# Patient Record
Sex: FEMALE | Race: WHITE | NOT HISPANIC OR LATINO | Employment: OTHER | URBAN - NONMETROPOLITAN AREA
[De-identification: names, ages, dates, MRNs, and addresses within clinical notes are randomized per-mention and may not be internally consistent; named-entity substitution may affect disease eponyms.]

---

## 2021-06-06 ENCOUNTER — HOSPITAL ENCOUNTER (EMERGENCY)
Facility: HOSPITAL | Age: 76
Discharge: LEFT BEFORE DISCHARGE | End: 2021-06-06
Admitting: EMERGENCY MEDICINE
Payer: COMMERCIAL

## 2021-06-06 VITALS
HEART RATE: 81 BPM | SYSTOLIC BLOOD PRESSURE: 149 MMHG | RESPIRATION RATE: 16 BRPM | TEMPERATURE: 97.7 F | DIASTOLIC BLOOD PRESSURE: 66 MMHG | OXYGEN SATURATION: 94 %

## 2021-06-06 PROCEDURE — 999N000104 HC STATISTIC NO CHARGE

## 2024-09-19 ENCOUNTER — LAB REQUISITION (OUTPATIENT)
Dept: LAB | Facility: CLINIC | Age: 79
End: 2024-09-19
Payer: COMMERCIAL

## 2024-09-19 DIAGNOSIS — M16.12 UNILATERAL PRIMARY OSTEOARTHRITIS, LEFT HIP: ICD-10-CM

## 2024-09-19 DIAGNOSIS — R13.19 OTHER DYSPHAGIA: ICD-10-CM

## 2024-09-19 DIAGNOSIS — I50.32 CHRONIC DIASTOLIC (CONGESTIVE) HEART FAILURE (H): ICD-10-CM

## 2024-09-20 LAB
ALBUMIN SERPL BCG-MCNC: 3.2 G/DL (ref 3.5–5.2)
ALP SERPL-CCNC: 209 U/L (ref 40–150)
ALT SERPL W P-5'-P-CCNC: 46 U/L (ref 0–50)
ANION GAP SERPL CALCULATED.3IONS-SCNC: 8 MMOL/L (ref 7–15)
AST SERPL W P-5'-P-CCNC: 90 U/L (ref 0–45)
BASOPHILS # BLD AUTO: 0 10E3/UL (ref 0–0.2)
BASOPHILS NFR BLD AUTO: 0 %
BILIRUB SERPL-MCNC: 0.5 MG/DL
BUN SERPL-MCNC: 12.7 MG/DL (ref 8–23)
CALCIUM SERPL-MCNC: 8.9 MG/DL (ref 8.8–10.4)
CHLORIDE SERPL-SCNC: 102 MMOL/L (ref 98–107)
CREAT SERPL-MCNC: 0.66 MG/DL (ref 0.51–0.95)
EGFRCR SERPLBLD CKD-EPI 2021: 90 ML/MIN/1.73M2
EOSINOPHIL # BLD AUTO: 0.2 10E3/UL (ref 0–0.7)
EOSINOPHIL NFR BLD AUTO: 3 %
ERYTHROCYTE [DISTWIDTH] IN BLOOD BY AUTOMATED COUNT: 12.7 % (ref 10–15)
GLUCOSE SERPL-MCNC: 135 MG/DL (ref 70–99)
HCO3 SERPL-SCNC: 27 MMOL/L (ref 22–29)
HCT VFR BLD AUTO: 28.3 % (ref 35–47)
HGB BLD-MCNC: 9 G/DL (ref 11.7–15.7)
IMM GRANULOCYTES # BLD: 0 10E3/UL
IMM GRANULOCYTES NFR BLD: 0 %
LYMPHOCYTES # BLD AUTO: 1 10E3/UL (ref 0.8–5.3)
LYMPHOCYTES NFR BLD AUTO: 15 %
MCH RBC QN AUTO: 30.4 PG (ref 26.5–33)
MCHC RBC AUTO-ENTMCNC: 31.8 G/DL (ref 31.5–36.5)
MCV RBC AUTO: 96 FL (ref 78–100)
MONOCYTES # BLD AUTO: 0.6 10E3/UL (ref 0–1.3)
MONOCYTES NFR BLD AUTO: 9 %
NEUTROPHILS # BLD AUTO: 4.7 10E3/UL (ref 1.6–8.3)
NEUTROPHILS NFR BLD AUTO: 72 %
NRBC # BLD AUTO: 0 10E3/UL
NRBC BLD AUTO-RTO: 0 /100
PLATELET # BLD AUTO: 254 10E3/UL (ref 150–450)
POTASSIUM SERPL-SCNC: 4 MMOL/L (ref 3.4–5.3)
PROT SERPL-MCNC: 6.1 G/DL (ref 6.4–8.3)
RBC # BLD AUTO: 2.96 10E6/UL (ref 3.8–5.2)
SODIUM SERPL-SCNC: 137 MMOL/L (ref 135–145)
WBC # BLD AUTO: 6.5 10E3/UL (ref 4–11)

## 2024-09-20 PROCEDURE — 36415 COLL VENOUS BLD VENIPUNCTURE: CPT | Mod: ORL | Performed by: FAMILY MEDICINE

## 2024-09-20 PROCEDURE — 80053 COMPREHEN METABOLIC PANEL: CPT | Mod: ORL | Performed by: FAMILY MEDICINE

## 2024-09-20 PROCEDURE — P9603 ONE-WAY ALLOW PRORATED MILES: HCPCS | Mod: ORL | Performed by: FAMILY MEDICINE

## 2024-09-20 PROCEDURE — 85025 COMPLETE CBC W/AUTO DIFF WBC: CPT | Mod: ORL | Performed by: FAMILY MEDICINE

## 2025-06-11 ENCOUNTER — APPOINTMENT (OUTPATIENT)
Dept: GENERAL RADIOLOGY | Facility: CLINIC | Age: 80
DRG: 885 | End: 2025-06-11
Attending: EMERGENCY MEDICINE
Payer: COMMERCIAL

## 2025-06-11 ENCOUNTER — HOSPITAL ENCOUNTER (INPATIENT)
Facility: CLINIC | Age: 80
DRG: 885 | End: 2025-06-11
Attending: EMERGENCY MEDICINE | Admitting: INTERNAL MEDICINE
Payer: COMMERCIAL

## 2025-06-11 DIAGNOSIS — F23 ACUTE PSYCHOSIS (H): ICD-10-CM

## 2025-06-11 DIAGNOSIS — M54.50 LOW BACK PAIN, UNSPECIFIED BACK PAIN LATERALITY, UNSPECIFIED CHRONICITY, UNSPECIFIED WHETHER SCIATICA PRESENT: ICD-10-CM

## 2025-06-11 DIAGNOSIS — F41.9 ANXIETY: ICD-10-CM

## 2025-06-11 DIAGNOSIS — K21.9 GASTROESOPHAGEAL REFLUX DISEASE, UNSPECIFIED WHETHER ESOPHAGITIS PRESENT: ICD-10-CM

## 2025-06-11 DIAGNOSIS — J12.82 PNEUMONIA DUE TO COVID-19 VIRUS: ICD-10-CM

## 2025-06-11 DIAGNOSIS — R21 RASH: Primary | ICD-10-CM

## 2025-06-11 DIAGNOSIS — U07.1 PNEUMONIA DUE TO COVID-19 VIRUS: ICD-10-CM

## 2025-06-11 LAB
ALBUMIN SERPL BCG-MCNC: 4.6 G/DL (ref 3.5–5.2)
ALBUMIN UR-MCNC: NEGATIVE MG/DL
ALP SERPL-CCNC: 106 U/L (ref 40–150)
ALT SERPL W P-5'-P-CCNC: 30 U/L (ref 0–50)
ANION GAP SERPL CALCULATED.3IONS-SCNC: 14 MMOL/L (ref 7–15)
APPEARANCE UR: ABNORMAL
AST SERPL W P-5'-P-CCNC: 52 U/L (ref 0–45)
BACTERIA #/AREA URNS HPF: ABNORMAL /HPF
BASOPHILS # BLD AUTO: 0 10E3/UL (ref 0–0.2)
BASOPHILS NFR BLD AUTO: 0 %
BILIRUB SERPL-MCNC: 0.6 MG/DL
BILIRUB UR QL STRIP: NEGATIVE
BUN SERPL-MCNC: 19.1 MG/DL (ref 8–23)
CALCIUM SERPL-MCNC: 10.2 MG/DL (ref 8.8–10.4)
CHLORIDE SERPL-SCNC: 99 MMOL/L (ref 98–107)
COLOR UR AUTO: ABNORMAL
CREAT SERPL-MCNC: 0.88 MG/DL (ref 0.51–0.95)
CYCLE THRESHOLD (CT): 29.3
EGFRCR SERPLBLD CKD-EPI 2021: 67 ML/MIN/1.73M2
EOSINOPHIL # BLD AUTO: 0.1 10E3/UL (ref 0–0.7)
EOSINOPHIL NFR BLD AUTO: 1 %
ERYTHROCYTE [DISTWIDTH] IN BLOOD BY AUTOMATED COUNT: 12.7 % (ref 10–15)
ETHANOL SERPL-MCNC: <0.01 G/DL
FLUAV RNA SPEC QL NAA+PROBE: NEGATIVE
FLUBV RNA RESP QL NAA+PROBE: NEGATIVE
GLUCOSE BLDC GLUCOMTR-MCNC: 131 MG/DL (ref 70–99)
GLUCOSE BLDC GLUCOMTR-MCNC: 194 MG/DL (ref 70–99)
GLUCOSE BLDC GLUCOMTR-MCNC: 341 MG/DL (ref 70–99)
GLUCOSE SERPL-MCNC: 197 MG/DL (ref 70–99)
GLUCOSE UR STRIP-MCNC: NEGATIVE MG/DL
HCO3 SERPL-SCNC: 25 MMOL/L (ref 22–29)
HCT VFR BLD AUTO: 40.5 % (ref 35–47)
HGB BLD-MCNC: 13.1 G/DL (ref 11.7–15.7)
HGB UR QL STRIP: NEGATIVE
HYALINE CASTS: 12 /LPF
IMM GRANULOCYTES # BLD: 0 10E3/UL
IMM GRANULOCYTES NFR BLD: 0 %
KETONES UR STRIP-MCNC: NEGATIVE MG/DL
LEUKOCYTE ESTERASE UR QL STRIP: ABNORMAL
LYMPHOCYTES # BLD AUTO: 2.1 10E3/UL (ref 0.8–5.3)
LYMPHOCYTES NFR BLD AUTO: 29 %
MCH RBC QN AUTO: 29.3 PG (ref 26.5–33)
MCHC RBC AUTO-ENTMCNC: 32.3 G/DL (ref 31.5–36.5)
MCV RBC AUTO: 91 FL (ref 78–100)
MONOCYTES # BLD AUTO: 0.6 10E3/UL (ref 0–1.3)
MONOCYTES NFR BLD AUTO: 9 %
NEUTROPHILS # BLD AUTO: 4.3 10E3/UL (ref 1.6–8.3)
NEUTROPHILS NFR BLD AUTO: 61 %
NITRATE UR QL: NEGATIVE
NRBC # BLD AUTO: 0 10E3/UL
NRBC BLD AUTO-RTO: 0 /100
NT-PROBNP SERPL-MCNC: 592 PG/ML (ref 0–624)
PH UR STRIP: 5.5 [PH] (ref 5–7)
PLATELET # BLD AUTO: 363 10E3/UL (ref 150–450)
POTASSIUM SERPL-SCNC: 3.8 MMOL/L (ref 3.4–5.3)
PROT SERPL-MCNC: 7.6 G/DL (ref 6.4–8.3)
RBC # BLD AUTO: 4.47 10E6/UL (ref 3.8–5.2)
RBC URINE: 3 /HPF
RSV RNA SPEC NAA+PROBE: NEGATIVE
SARS-COV-2 RNA RESP QL NAA+PROBE: POSITIVE
SODIUM SERPL-SCNC: 138 MMOL/L (ref 135–145)
SP GR UR STRIP: 1.01 (ref 1–1.03)
SQUAMOUS EPITHELIAL: 8 /HPF
TRANSITIONAL EPI: 2 /HPF
UROBILINOGEN UR STRIP-MCNC: NORMAL MG/DL
WBC # BLD AUTO: 7.1 10E3/UL (ref 4–11)
WBC URINE: 22 /HPF

## 2025-06-11 PROCEDURE — 85025 COMPLETE CBC W/AUTO DIFF WBC: CPT | Performed by: EMERGENCY MEDICINE

## 2025-06-11 PROCEDURE — 250N000012 HC RX MED GY IP 250 OP 636 PS 637: Performed by: INTERNAL MEDICINE

## 2025-06-11 PROCEDURE — 250N000011 HC RX IP 250 OP 636: Performed by: EMERGENCY MEDICINE

## 2025-06-11 PROCEDURE — 71045 X-RAY EXAM CHEST 1 VIEW: CPT | Mod: 26 | Performed by: RADIOLOGY

## 2025-06-11 PROCEDURE — 99223 1ST HOSP IP/OBS HIGH 75: CPT | Mod: FS | Performed by: INTERNAL MEDICINE

## 2025-06-11 PROCEDURE — 99285 EMERGENCY DEPT VISIT HI MDM: CPT | Performed by: EMERGENCY MEDICINE

## 2025-06-11 PROCEDURE — 99207 PR APP CREDIT; MD BILLING SHARED VISIT: CPT | Mod: FS

## 2025-06-11 PROCEDURE — 99285 EMERGENCY DEPT VISIT HI MDM: CPT | Mod: 25 | Performed by: EMERGENCY MEDICINE

## 2025-06-11 PROCEDURE — 250N000011 HC RX IP 250 OP 636

## 2025-06-11 PROCEDURE — 83880 ASSAY OF NATRIURETIC PEPTIDE: CPT | Performed by: EMERGENCY MEDICINE

## 2025-06-11 PROCEDURE — 250N000013 HC RX MED GY IP 250 OP 250 PS 637: Performed by: INTERNAL MEDICINE

## 2025-06-11 PROCEDURE — 99223 1ST HOSP IP/OBS HIGH 75: CPT | Mod: GC | Performed by: PSYCHIATRY & NEUROLOGY

## 2025-06-11 PROCEDURE — 71045 X-RAY EXAM CHEST 1 VIEW: CPT

## 2025-06-11 PROCEDURE — 82962 GLUCOSE BLOOD TEST: CPT

## 2025-06-11 PROCEDURE — 96374 THER/PROPH/DIAG INJ IV PUSH: CPT | Performed by: EMERGENCY MEDICINE

## 2025-06-11 PROCEDURE — 36415 COLL VENOUS BLD VENIPUNCTURE: CPT | Performed by: EMERGENCY MEDICINE

## 2025-06-11 PROCEDURE — 87637 SARSCOV2&INF A&B&RSV AMP PRB: CPT | Performed by: EMERGENCY MEDICINE

## 2025-06-11 PROCEDURE — 250N000013 HC RX MED GY IP 250 OP 250 PS 637

## 2025-06-11 PROCEDURE — 81001 URINALYSIS AUTO W/SCOPE: CPT | Performed by: EMERGENCY MEDICINE

## 2025-06-11 PROCEDURE — 87086 URINE CULTURE/COLONY COUNT: CPT | Performed by: EMERGENCY MEDICINE

## 2025-06-11 PROCEDURE — 80053 COMPREHEN METABOLIC PANEL: CPT | Performed by: EMERGENCY MEDICINE

## 2025-06-11 PROCEDURE — 120N000002 HC R&B MED SURG/OB UMMC

## 2025-06-11 PROCEDURE — 99418 PROLNG IP/OBS E/M EA 15 MIN: CPT | Mod: FS | Performed by: INTERNAL MEDICINE

## 2025-06-11 PROCEDURE — 82077 ASSAY SPEC XCP UR&BREATH IA: CPT | Performed by: EMERGENCY MEDICINE

## 2025-06-11 RX ORDER — QUETIAPINE FUMARATE 25 MG/1
25 TABLET, FILM COATED ORAL 2 TIMES DAILY
Status: DISCONTINUED | OUTPATIENT
Start: 2025-06-11 | End: 2025-06-17

## 2025-06-11 RX ORDER — POLYETHYLENE GLYCOL 3350 17 G/17G
17 POWDER, FOR SOLUTION ORAL DAILY
COMMUNITY
Start: 2025-03-07

## 2025-06-11 RX ORDER — INSULIN GLARGINE 300 U/ML
16 INJECTION, SOLUTION SUBCUTANEOUS EVERY EVENING
COMMUNITY
Start: 2025-04-25

## 2025-06-11 RX ORDER — LAMOTRIGINE 200 MG/1
200 TABLET ORAL 2 TIMES DAILY
COMMUNITY
Start: 2025-05-06

## 2025-06-11 RX ORDER — QUETIAPINE FUMARATE 50 MG/1
50 TABLET, FILM COATED ORAL AT BEDTIME
Status: DISCONTINUED | OUTPATIENT
Start: 2025-06-11 | End: 2025-06-12

## 2025-06-11 RX ORDER — FLUTICASONE PROPIONATE 50 MCG
1 SPRAY, SUSPENSION (ML) NASAL DAILY PRN
Status: DISCONTINUED | OUTPATIENT
Start: 2025-06-12 | End: 2025-06-24 | Stop reason: HOSPADM

## 2025-06-11 RX ORDER — LORAZEPAM 2 MG/ML
1 INJECTION INTRAMUSCULAR ONCE
Status: COMPLETED | OUTPATIENT
Start: 2025-06-11 | End: 2025-06-11

## 2025-06-11 RX ORDER — DULAGLUTIDE 3 MG/.5ML
INJECTION, SOLUTION SUBCUTANEOUS
COMMUNITY
Start: 2025-04-28

## 2025-06-11 RX ORDER — ROSUVASTATIN CALCIUM 20 MG/1
20 TABLET, COATED ORAL EVERY EVENING
Status: DISCONTINUED | OUTPATIENT
Start: 2025-06-11 | End: 2025-06-24 | Stop reason: HOSPADM

## 2025-06-11 RX ORDER — HYDROCHLOROTHIAZIDE 12.5 MG/1
CAPSULE ORAL
COMMUNITY
Start: 2025-04-11

## 2025-06-11 RX ORDER — ONDANSETRON 2 MG/ML
4 INJECTION INTRAMUSCULAR; INTRAVENOUS EVERY 6 HOURS PRN
Status: DISCONTINUED | OUTPATIENT
Start: 2025-06-11 | End: 2025-06-24 | Stop reason: HOSPADM

## 2025-06-11 RX ORDER — ACETAMINOPHEN 650 MG/1
650 SUPPOSITORY RECTAL EVERY 4 HOURS PRN
Status: DISCONTINUED | OUTPATIENT
Start: 2025-06-11 | End: 2025-06-24 | Stop reason: HOSPADM

## 2025-06-11 RX ORDER — ACETAMINOPHEN 325 MG/1
650 TABLET ORAL EVERY 4 HOURS PRN
Status: DISCONTINUED | OUTPATIENT
Start: 2025-06-11 | End: 2025-06-24 | Stop reason: HOSPADM

## 2025-06-11 RX ORDER — MONTELUKAST SODIUM 10 MG/1
10 TABLET ORAL DAILY
Status: DISCONTINUED | OUTPATIENT
Start: 2025-06-12 | End: 2025-06-24 | Stop reason: HOSPADM

## 2025-06-11 RX ORDER — VITAMIN B COMPLEX
25 TABLET ORAL DAILY
Status: DISCONTINUED | OUTPATIENT
Start: 2025-06-12 | End: 2025-06-24 | Stop reason: HOSPADM

## 2025-06-11 RX ORDER — OLOPATADINE HYDROCHLORIDE 1 MG/ML
1 SOLUTION OPHTHALMIC 2 TIMES DAILY
Status: DISCONTINUED | OUTPATIENT
Start: 2025-06-11 | End: 2025-06-24 | Stop reason: HOSPADM

## 2025-06-11 RX ORDER — LIDOCAINE 40 MG/G
CREAM TOPICAL
Status: DISCONTINUED | OUTPATIENT
Start: 2025-06-11 | End: 2025-06-24 | Stop reason: HOSPADM

## 2025-06-11 RX ORDER — CALCIUM CARBONATE 500 MG/1
1000 TABLET, CHEWABLE ORAL 4 TIMES DAILY PRN
Status: DISCONTINUED | OUTPATIENT
Start: 2025-06-11 | End: 2025-06-24 | Stop reason: HOSPADM

## 2025-06-11 RX ORDER — QUETIAPINE FUMARATE 150 MG/1
150 TABLET, FILM COATED ORAL AT BEDTIME
Status: ON HOLD | COMMUNITY
Start: 2025-06-06 | End: 2025-06-23

## 2025-06-11 RX ORDER — FUROSEMIDE 20 MG/1
20 TABLET ORAL 2 TIMES DAILY
Status: DISCONTINUED | OUTPATIENT
Start: 2025-06-11 | End: 2025-06-24 | Stop reason: HOSPADM

## 2025-06-11 RX ORDER — LAMOTRIGINE 200 MG/1
200 TABLET ORAL 2 TIMES DAILY
Status: DISCONTINUED | OUTPATIENT
Start: 2025-06-11 | End: 2025-06-24 | Stop reason: HOSPADM

## 2025-06-11 RX ORDER — OLOPATADINE HYDROCHLORIDE 1 MG/ML
SOLUTION OPHTHALMIC
COMMUNITY
Start: 2024-06-03

## 2025-06-11 RX ORDER — PIMECROLIMUS 10 MG/G
CREAM TOPICAL 2 TIMES DAILY
Status: DISCONTINUED | OUTPATIENT
Start: 2025-06-11 | End: 2025-06-24 | Stop reason: HOSPADM

## 2025-06-11 RX ORDER — ALBUTEROL SULFATE 0.83 MG/ML
2.5 SOLUTION RESPIRATORY (INHALATION) EVERY 4 HOURS PRN
Status: DISCONTINUED | OUTPATIENT
Start: 2025-06-11 | End: 2025-06-24 | Stop reason: HOSPADM

## 2025-06-11 RX ORDER — FUROSEMIDE 20 MG/1
20 TABLET ORAL 2 TIMES DAILY
COMMUNITY
Start: 2025-01-10

## 2025-06-11 RX ORDER — QUETIAPINE FUMARATE 25 MG/1
TABLET, FILM COATED ORAL
Status: ON HOLD | COMMUNITY
Start: 2025-06-06 | End: 2025-06-23

## 2025-06-11 RX ORDER — PANTOPRAZOLE SODIUM 40 MG/1
40 TABLET, DELAYED RELEASE ORAL
Status: DISCONTINUED | OUTPATIENT
Start: 2025-06-11 | End: 2025-06-24 | Stop reason: HOSPADM

## 2025-06-11 RX ORDER — CLOMIPRAMINE HYDROCHLORIDE 50 MG/1
50 CAPSULE ORAL AT BEDTIME
Status: DISCONTINUED | OUTPATIENT
Start: 2025-06-11 | End: 2025-06-12

## 2025-06-11 RX ORDER — CLOMIPRAMINE HYDROCHLORIDE 50 MG/1
CAPSULE ORAL
Status: ON HOLD | COMMUNITY
Start: 2025-05-22 | End: 2025-06-23

## 2025-06-11 RX ORDER — AMOXICILLIN 250 MG
1 CAPSULE ORAL 2 TIMES DAILY PRN
Status: DISCONTINUED | OUTPATIENT
Start: 2025-06-11 | End: 2025-06-24 | Stop reason: HOSPADM

## 2025-06-11 RX ORDER — RISPERIDONE 0.5 MG/1
0.5 TABLET ORAL DAILY
Status: DISCONTINUED | OUTPATIENT
Start: 2025-06-12 | End: 2025-06-12

## 2025-06-11 RX ORDER — ATENOLOL 25 MG/1
25 TABLET ORAL 2 TIMES DAILY
COMMUNITY
Start: 2025-04-19

## 2025-06-11 RX ORDER — LORAZEPAM 0.5 MG/1
TABLET ORAL
Status: ON HOLD | COMMUNITY
Start: 2025-05-22 | End: 2025-06-23

## 2025-06-11 RX ORDER — MONTELUKAST SODIUM 10 MG/1
10 TABLET ORAL DAILY
COMMUNITY
Start: 2024-10-15

## 2025-06-11 RX ORDER — MULTIPLE VITAMINS W/ MINERALS TAB 9MG-400MCG
1 TAB ORAL DAILY
COMMUNITY

## 2025-06-11 RX ORDER — ALBUTEROL SULFATE 0.83 MG/ML
SOLUTION RESPIRATORY (INHALATION)
COMMUNITY
Start: 2025-02-19

## 2025-06-11 RX ORDER — FLUTICASONE PROPIONATE 50 MCG
1 SPRAY, SUSPENSION (ML) NASAL DAILY
COMMUNITY

## 2025-06-11 RX ORDER — RISPERIDONE 1 MG/1
1 TABLET ORAL AT BEDTIME
Status: DISCONTINUED | OUTPATIENT
Start: 2025-06-11 | End: 2025-06-13

## 2025-06-11 RX ORDER — OMEPRAZOLE 20 MG/1
CAPSULE, DELAYED RELEASE ORAL
Status: ON HOLD | COMMUNITY
Start: 2025-01-06 | End: 2025-06-23

## 2025-06-11 RX ORDER — OMEPRAZOLE 20 MG/1
20 CAPSULE, DELAYED RELEASE ORAL
Status: DISCONTINUED | OUTPATIENT
Start: 2025-06-11 | End: 2025-06-11

## 2025-06-11 RX ORDER — KETOCONAZOLE 20 MG/G
CREAM TOPICAL 2 TIMES DAILY
Status: ON HOLD | COMMUNITY
End: 2025-06-23

## 2025-06-11 RX ORDER — ATENOLOL 25 MG/1
25 TABLET ORAL 2 TIMES DAILY
Status: DISCONTINUED | OUTPATIENT
Start: 2025-06-11 | End: 2025-06-24 | Stop reason: HOSPADM

## 2025-06-11 RX ORDER — AMOXICILLIN 250 MG
2 CAPSULE ORAL 2 TIMES DAILY PRN
Status: DISCONTINUED | OUTPATIENT
Start: 2025-06-11 | End: 2025-06-24 | Stop reason: HOSPADM

## 2025-06-11 RX ORDER — TRIAMCINOLONE ACETONIDE 1 MG/G
CREAM TOPICAL
COMMUNITY

## 2025-06-11 RX ORDER — DEXTROSE MONOHYDRATE 25 G/50ML
25-50 INJECTION, SOLUTION INTRAVENOUS
Status: DISCONTINUED | OUTPATIENT
Start: 2025-06-11 | End: 2025-06-24 | Stop reason: HOSPADM

## 2025-06-11 RX ORDER — CEFTRIAXONE 1 G/1
1 INJECTION, POWDER, FOR SOLUTION INTRAMUSCULAR; INTRAVENOUS ONCE
Status: COMPLETED | OUTPATIENT
Start: 2025-06-11 | End: 2025-06-11

## 2025-06-11 RX ORDER — PIMECROLIMUS 10 MG/G
CREAM TOPICAL
COMMUNITY

## 2025-06-11 RX ORDER — HALOPERIDOL 5 MG/ML
5 INJECTION INTRAMUSCULAR ONCE
Status: COMPLETED | OUTPATIENT
Start: 2025-06-11 | End: 2025-06-11

## 2025-06-11 RX ORDER — ROSUVASTATIN CALCIUM 20 MG/1
TABLET, COATED ORAL
COMMUNITY
Start: 2025-02-20

## 2025-06-11 RX ORDER — ACETAMINOPHEN 500 MG
TABLET ORAL
COMMUNITY
Start: 2024-09-16

## 2025-06-11 RX ORDER — POLYETHYLENE GLYCOL 3350 17 G/17G
17 POWDER, FOR SOLUTION ORAL DAILY
Status: DISCONTINUED | OUTPATIENT
Start: 2025-06-12 | End: 2025-06-24 | Stop reason: HOSPADM

## 2025-06-11 RX ORDER — LOPERAMIDE HYDROCHLORIDE 2 MG/1
2 CAPSULE ORAL 4 TIMES DAILY PRN
COMMUNITY
Start: 2025-03-07

## 2025-06-11 RX ORDER — KETOCONAZOLE 20 MG/G
CREAM TOPICAL 2 TIMES DAILY PRN
Status: DISCONTINUED | OUTPATIENT
Start: 2025-06-11 | End: 2025-06-24 | Stop reason: HOSPADM

## 2025-06-11 RX ORDER — ONDANSETRON 4 MG/1
4 TABLET, ORALLY DISINTEGRATING ORAL EVERY 6 HOURS PRN
Status: DISCONTINUED | OUTPATIENT
Start: 2025-06-11 | End: 2025-06-24 | Stop reason: HOSPADM

## 2025-06-11 RX ORDER — NICOTINE POLACRILEX 4 MG
15-30 LOZENGE BUCCAL
Status: DISCONTINUED | OUTPATIENT
Start: 2025-06-11 | End: 2025-06-24 | Stop reason: HOSPADM

## 2025-06-11 RX ORDER — PYRIDOXINE HCL (VITAMIN B6) 25 MG
100 TABLET ORAL DAILY
Status: DISCONTINUED | OUTPATIENT
Start: 2025-06-12 | End: 2025-06-24 | Stop reason: HOSPADM

## 2025-06-11 RX ORDER — RISPERIDONE 0.5 MG/1
0.5 TABLET ORAL 2 TIMES DAILY PRN
Status: DISCONTINUED | OUTPATIENT
Start: 2025-06-11 | End: 2025-06-24 | Stop reason: HOSPADM

## 2025-06-11 RX ADMIN — PANTOPRAZOLE SODIUM 40 MG: 40 TABLET, DELAYED RELEASE ORAL at 17:57

## 2025-06-11 RX ADMIN — RISPERIDONE 1 MG: 1 TABLET, FILM COATED ORAL at 21:31

## 2025-06-11 RX ADMIN — DICLOFENAC SODIUM TOPICAL GEL, 1% 4 G: 10 GEL TOPICAL at 21:32

## 2025-06-11 RX ADMIN — ROSUVASTATIN 20 MG: 20 TABLET, FILM COATED ORAL at 21:30

## 2025-06-11 RX ADMIN — HALOPERIDOL LACTATE 5 MG: 5 INJECTION, SOLUTION INTRAMUSCULAR at 15:50

## 2025-06-11 RX ADMIN — CEFTRIAXONE SODIUM 1 G: 1 INJECTION, POWDER, FOR SOLUTION INTRAMUSCULAR; INTRAVENOUS at 18:05

## 2025-06-11 RX ADMIN — INSULIN ASPART 3 UNITS: 100 INJECTION, SOLUTION INTRAVENOUS; SUBCUTANEOUS at 21:42

## 2025-06-11 RX ADMIN — OLOPATADINE HYDROCHLORIDE 1 DROP: 1 SOLUTION OPHTHALMIC at 21:30

## 2025-06-11 RX ADMIN — APIXABAN 2.5 MG: 2.5 TABLET, FILM COATED ORAL at 21:30

## 2025-06-11 RX ADMIN — QUETIAPINE FUMARATE 50 MG: 50 TABLET ORAL at 21:31

## 2025-06-11 RX ADMIN — LORAZEPAM 1 MG: 2 INJECTION INTRAMUSCULAR; INTRAVENOUS at 11:31

## 2025-06-11 RX ADMIN — FUROSEMIDE 20 MG: 20 TABLET ORAL at 21:30

## 2025-06-11 RX ADMIN — ATENOLOL 25 MG: 25 TABLET ORAL at 21:32

## 2025-06-11 RX ADMIN — LAMOTRIGINE 200 MG: 200 TABLET ORAL at 21:31

## 2025-06-11 RX ADMIN — INSULIN GLARGINE 13 UNITS: 100 INJECTION, SOLUTION SUBCUTANEOUS at 21:42

## 2025-06-11 RX ADMIN — PIMECROLIMUS: 10 CREAM TOPICAL at 21:31

## 2025-06-11 RX ADMIN — QUETIAPINE FUMARATE 25 MG: 25 TABLET ORAL at 18:30

## 2025-06-11 RX ADMIN — CLOMIPRAMINE HYDROCHLORIDE 50 MG: 50 CAPSULE ORAL at 21:31

## 2025-06-11 ASSESSMENT — ACTIVITIES OF DAILY LIVING (ADL)
ADLS_ACUITY_SCORE: 41
ADLS_ACUITY_SCORE: 41
ADLS_ACUITY_SCORE: 43
ADLS_ACUITY_SCORE: 41
ADLS_ACUITY_SCORE: 41
ADLS_ACUITY_SCORE: 43
ADLS_ACUITY_SCORE: 41
ADLS_ACUITY_SCORE: 41
ADLS_ACUITY_SCORE: 43
ADLS_ACUITY_SCORE: 41

## 2025-06-11 NOTE — H&P
[facility]    History and Physical - Hospitalist Service, GOLD TEAM        Date of Admission:  6/11/2025    Assessment & Plan      Dominique Harkins is a 79-year-old female with a history of bipolar disorder, borderline personality disorder, dependent personality disorder, PE and paroxysmal A-fib,previous CVA on  chronic anticoagulation, Pace maker, type II DM, COPD and CHF. Patient brought the ER by ambulance for Altered mental status     Acute Psychosis  Patient brought the ER by ambulance for acute psychotic episode after the  call 911. She was found in the seat of the 's car wanting to get some tests at the hospital, Paramedics noted that patient was rambling and not making senses. EMS gave 10 mg Zyprexa on site prior to transporting to the ER.  Of note, patient returned home of 4 days ago following recent hospitalization to [facility] (5/15-6/6/2025)      {# Confirmed COVID-19 Infection (Optional):282429}        Diet:  ***  DVT Prophylaxis: {DVT PROPHYLAXIS:836567}  Lubin Catheter: Not present  Lines: None     Cardiac Monitoring: None  Code Status:  ***    Clinically Significant Risk Factors Present on Admission   { TIP  This section helps capture the illness of the patient on admission.     - Review diagnoses highlighted in blue; right click, edit & delete if not appropriate   - If blank, no additional diagnoses identified   :49663}                                     Disposition Plan   {TIP  The patient's Medical Readiness for Discharge [MRD] has been documented today or is 'Ready Now'. Last Documentation- Anticipated in 2-4 Days   Use SmartList below if a change is needed.  :30638}  Medically Ready for Discharge: {TIME; MEDICALLY READY FOR DISCHARGE:27426242}           Nilda Bright RN  Hospitalist Service, GOLD TEAM   [facility]  Securely message with [employer] (more info)  Text page via [employer]  "Paging/Directory   See signed in provider for up to date coverage information    ______________________________________________________________________    Chief Complaint   ***    {History obtained from:3370939}    History of Present Illness   Dominique Harkins is a 79 year old female who ***      Past Medical History    No past medical history on file.    Past Surgical History   Past Surgical History:   Procedure Laterality Date    IR LUMBAR PUNCTURE  12/10/2024    IR LUMBAR PUNCTURE  11/16/2023    IR LUMBAR PUNCTURE  8/24/2023    IR LUMBAR PUNCTURE  8/5/2022    IR LUMBAR PUNCTURE  7/7/2022    IR LUMBAR PUNCTURE  11/12/2021    IR LUMBAR PUNCTURE  3/2/2021    IR LUMBAR PUNCTURE  1/28/2021    IR LUMBAR PUNCTURE  12/22/2020    IR LUMBAR PUNCTURE  9/30/2020    IR LUMBAR PUNCTURE  4/18/2016    IR LUMBAR PUNCTURE  3/28/2016    IR LUMBAR PUNCTURE  12/31/2015    IR LUMBAR PUNCTURE  12/17/2015       Prior to Admission Medications   Prior to Admission Medications   Prescriptions Last Dose Informant Patient Reported? Taking?   dulaglutide (TRULICITY) 0.75 MG/0.5ML pen   Yes No   Sig: Inject 0.75 mg Subcutaneous every 7 days   melatonin 5 MG tablet   Yes No   Sig: Take 5 mg by mouth nightly as needed for sleep Takes 3 tablets      Facility-Administered Medications: None      {Additional Note Sections (OPTIONAL)  :990140}     Physical Exam   Vital Signs: Temp: 97.9  F (36.6  C) Temp src: Oral BP: 123/66 Pulse: 72   Resp: 18 SpO2: 96 % O2 Device: None (Room air)    Weight: 0 lbs 0 oz    {Recommend personal SmartPhrase or Notewriter for exam (OPTIONAL)    :948611}    Medical Decision Making   { TIP   MDM Calculator    MDM grid (w/ times)    Coding Support Chat  Billing is now based on time OR medical decision making complexity. Medical decision making included in your A&P does NOT need to be re-documented here.    :24038}    {Time  :800529::\"*** MINUTES SPENT BY ME on the date of service doing chart review, history, exam, " "documentation & further activities per the note.\"}      Data   {INSERT LABS/IMAGING (OPTIONAL)   :894582}  "

## 2025-06-11 NOTE — MEDICATION SCRIBE - ADMISSION MEDICATION HISTORY
Medication Scribe Admission Medication History    Admission medication history is complete. The information provided in this note is only as accurate as the sources available at the time of the update.    Information Source(s): Spouse via in-person    Pertinent Information: Unable to speak with patient about medication hx as patient is AMS.  Spoke with patient and patient spouse in person and completed medication hx. Patient spouse states that patient was recently admitted in [facility] for about a month now (05/15/2025-06/06/2025). Spouse states that the most current medication list is the one from [facility]. Writer also was able to obtain that medication record as well. Spouse states that he last gave patient medications this morning.   Spouse states that the Trulicity is currently on hold as they are waiting for provider approval. Medication was not given in 3 weeks while patient was hospitalized according to spouse.   All medications on list were newly added per discharge summary from [facility] on 06/06/2025.       Changes made to PTA medication list:  Added: All medications listed were newly added.   Deleted: Trulicity 0.75 MG/0.5ML Pen           Melatonin 5 MG Tab  Changed: None    Allergies reviewed with patient and updates made in EHR: no    Medication History Completed By: Robina Pedraza 6/11/2025 1:52 PM    PTA Med List   Medication Sig Last Dose/Taking    acetaminophen (TYLENOL) 500 MG tablet Take 2 Tablets (1,000 mg) by mouth three times a day. 24 hour limit of acetaminophen (TYLENOL) is 4000mg. Indications: Pain Taking    albuterol (PROVENTIL) (2.5 MG/3ML) 0.083% neb solution Inhale 1 Each (2.5 mg) every 4 hours as needed for Wheezing. Taking    apixaban ANTICOAGULANT (ELIQUIS) 2.5 MG tablet Take 1 Tablet (2.5 mg) by mouth two times a day. Taking    atenolol (TENORMIN) 25 MG tablet Take 25 mg by mouth 2 times daily. Taking    Cholecalciferol (VITAMIN D3) 25 MCG (1000 UT) CAPS Take 1 Capsule by mouth  every morning. Indications: Osteoporosis Taking    clomiPRAMINE (ANAFRANIL) 50 MG capsule Take 1 Capsule (50 mg) by mouth daily at bedtime. Indications: Obsessive Compulsive Disorder Taking    Continuous Glucose Sensor (FREESTYLE SAMI 3 PLUS SENSOR) MISC USE AS DIRECTED FOR CONTINOUS BLOOD GLUCOSE MONITORING. REPLACE SENSOR EVERY 15 DAYS Taking    diclofenac (VOLTAREN) 1 % topical gel Apply 4 g topically 4 times daily. Taking    fluticasone (FLONASE) 50 MCG/ACT nasal spray Spray 1 spray into both nostrils daily. Taking    furosemide (LASIX) 20 MG tablet Take 20 mg by mouth 2 times daily. Taking    ketoconazole (NIZORAL) 2 % external cream Apply topically 2 times daily. Taking    lamoTRIgine (LAMICTAL) 200 MG tablet Take 200 mg by mouth 2 times daily. Taking    loperamide (IMODIUM) 2 MG capsule Take 2 mg by mouth 4 times daily as needed for diarrhea. Taking As Needed    LORazepam (ATIVAN) 0.5 MG tablet Take 1 Tablet (0.5 mg) by mouth daily at bedtime. Indications: Trouble Sleeping Taking    melatonin 1 MG TABS tablet Take 0.5-2 mg by mouth at bedtime. May take 15 mg sometimes. Taking    montelukast (SINGULAIR) 10 MG tablet Take 10 mg by mouth daily. Taking    multivitamin w/minerals (THERA-VIT-M) tablet Take 1 tablet by mouth daily. Taking    olopatadine (PATANOL) 0.1 % ophthalmic solution Place 1 Drop into both eyes two times a day. Taking    omeprazole (PRILOSEC) 20 MG DR capsule Take 1 Capsule (20 mg) by mouth two times a day before meals. Take 1 hour before meals Taking    pimecrolimus (ELIDEL) 1 % external cream Apply topically BID to rash on trunk and body fold areas. Taking    polyethylene glycol (MIRALAX) 17 GM/Dose powder Take 17 g by mouth daily. Taking    psyllium (METAMUCIL/KONSYL) 0.52 g capsule Take 1 Capsule (0.52 g) by mouth daily. Taking    pyridOXINE (VITAMIN B6) 100 MG TABS Take 100 mg by mouth daily. Taking    QUEtiapine (SEROQUEL) 25 MG tablet Take 3 Tablets (75 mg) by mouth three times a day.  Indications: Manic-Depression Taking    QUEtiapine Fumarate 150 MG TABS Take 150 mg by mouth at bedtime. Taking    rosuvastatin (CRESTOR) 20 MG tablet TAKE 1 TABLET(20 MG) BY MOUTH DAILY AT BEDTIME Taking    TOUJEO SOLOSTAR 300 UNIT/ML (1 units dial) pen Inject 16 Units subcutaneously every evening. Taking    triamcinolone (KENALOG) 0.1 % external cream Apply topically to affected areas on body BID for 1-3 weeks. Do not use on face, body folds or genitals. Taking    TRULICITY 3 MG/0.5ML SOAJ ADMINISTER 3 MG UNDER THE SKIN 1 TIME A WEEK Taking

## 2025-06-11 NOTE — H&P
[facility]    History and Physical - Hospitalist Service, GOLD TEAM        Date of Admission:  6/11/2025    Assessment & Plan      Dominique Harkins is a 79 year old female admitted on 6/11/2025. 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 She was brought to the ED via EMS for acute psychosis at home.     Acute psychosis vs delirium   Recent hospitalization for AMS and hypomania at [facility] (5/16-6/6/2025)  Bipolar disorder  Borderline personality disorder  Dependent personality disorder  OCD  Anxiety  PTSD  Insomnia   Presents to the ED after patient's  called 911 for acute psychotic behavior and unable to care for her at home. Patient was recently hospitalized at [facility] 5/16-6/6/2025 for AMS, hypomania and was discharged 4 days ago under the care of her . Patient's  says he found her in the seat of his car saying she needed to come to the hospital for tests and was exhibiting erratic and psychotic behavior. Labs largely unremarkable for infectious etiology. UA with leukocytes, 22 WBCs, negative nitrate. Previous head CT negative. At bedside, patient continues to exhibit tangential speech and disorganized thought processes.   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 ~ 2 weeks prior  (positive test 5/31/25). At time of positive test, patient exhibited cough and SOB. She was initially placed on Augmentin but was discontinued as it was felt less likely d/t bacterial pneumonia. CXR with possible infection vs edema/trace pleural effusion of right lung base. Currently on RA. Labs WNL. Unable to be admitted to inpatient psychiatry due to positive test per psychiatry. No COVID specific therapies are warranted at this time. Follows with OP pulmonology, last visit 4/2024. At bedside, patient continues to have loose/productive cough, denies SOB, and says it is improving.   - obtain cell cycle threshold, lab notified of request   - continue PTA anticoagulation as below   - pulse ox   - can consider SLP consult if concerned for aspiration   - incentive spirometry  - albuterol nebs PRN     Paroxysmal atrial fibrillation on chronic anticoagulation   PPM in place   HTN  HLD  Chronic diastolic HF   - continue PTA rosuvastatin, atenolol with hold parameters, lasix, eliquis    Hx of CVA   - anticoagulation as above     COPD   Asthma   - continue PTA albuterol neb and montelukast     Type 2 DM   HgbA1c 6.4%. PTA regimen dulaglutide weekly, Toujeo 16 units nightly. While at [facility], was on glargine 16 units and lispro sliding scale.   - repeat A1c   - continue glargine 16 units   - start sliding scale insulin   - POCT glucose checks  - hypoglycemia protocol    GERD  - continue PTA PPI     Chronic pain  Follows with OP pain clinic. Previously failed oxycodone and dilaudid and recently started on Butrans per chart review however is not on current med list.   - continue to monitor     Hx of gastroparesis: gastric emptying study showing moderate to severe gastroparesis likely worsened by Trulicity, which was decreased by PCP on 4/25. Was not prescribed this while admitted to [facility], will hold here as well.             Diet: Regular Diet Adult  DVT Prophylaxis: DOAC  Lubin Catheter: Not present  Lines: None     Cardiac  Monitoring: None  Code Status:      Clinically Significant Risk Factors Present on Admission                # Drug Induced Coagulation Defect: home medication list includes an anticoagulant medication                         Disposition Plan     Medically Ready for Discharge: Anticipated in 2-4 Days         The patient's care was discussed with the Attending Physician, Dr. David Leon, Bedside Nurse, Patient, and Patient's Family.    Ashley Santos NP  Hospitalist Service, [facility]  Securely message with Voc[employer] (more info)  Text page via [facility] Paging/Directory   See signed in provider for up to date coverage information    ______________________________________________________________________    Chief Complaint   Acute psychosis     History is obtained from the patient and electronic health record    History of Present Illness   Dominique Harkins is a 79 year old female admitted on 6/11/2025. 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 She was brought to the ED via EMS for acute psychosis at home.     Dominique was seen resting in bed, bedside attendant present. Difficult to obtain history as patient very tangential, disorganized thought processes. Called her  David who confirmed the timeline of events. We discussed the plan for hospital admission and the goal of transitioning to the inpatient mental health unit once able. All questions and concerns addressed at this time.       Past Medical History    No past medical history on file.    Past Surgical History   Past Surgical History:   Procedure Laterality Date    IR LUMBAR PUNCTURE  12/10/2024    IR LUMBAR PUNCTURE  11/16/2023    IR  LUMBAR PUNCTURE  8/24/2023    IR LUMBAR PUNCTURE  8/5/2022    IR LUMBAR PUNCTURE  7/7/2022    IR LUMBAR PUNCTURE  11/12/2021    IR LUMBAR PUNCTURE  3/2/2021    IR LUMBAR PUNCTURE  1/28/2021    IR LUMBAR PUNCTURE  12/22/2020    IR LUMBAR PUNCTURE  9/30/2020    IR LUMBAR PUNCTURE  4/18/2016    IR LUMBAR PUNCTURE  3/28/2016    IR LUMBAR PUNCTURE  12/31/2015    IR LUMBAR PUNCTURE  12/17/2015       Prior to Admission Medications   Prior to Admission Medications   Prescriptions Last Dose Informant Patient Reported? Taking?   Cholecalciferol (VITAMIN D3) 25 MCG (1000 UT) CAPS   Yes Yes   Sig: Take 1 Capsule by mouth every morning. Indications: Osteoporosis   Continuous Glucose Sensor (FREESTYLE SAMI 3 PLUS SENSOR) MISC   Yes Yes   Sig: USE AS DIRECTED FOR CONTINOUS BLOOD GLUCOSE MONITORING. REPLACE SENSOR EVERY 15 DAYS   LORazepam (ATIVAN) 0.5 MG tablet   Yes Yes   Sig: Take 1 Tablet (0.5 mg) by mouth daily at bedtime. Indications: Trouble Sleeping   QUEtiapine (SEROQUEL) 25 MG tablet   Yes Yes   Sig: Take 3 Tablets (75 mg) by mouth three times a day. Indications: Manic-Depression   QUEtiapine Fumarate 150 MG TABS   Yes Yes   Sig: Take 150 mg by mouth at bedtime.   TOUJEO SOLOSTAR 300 UNIT/ML (1 units dial) pen   Yes Yes   Sig: Inject 16 Units subcutaneously every evening.   TRULICITY 3 MG/0.5ML SOAJ   Yes Yes   Sig: ADMINISTER 3 MG UNDER THE SKIN 1 TIME A WEEK   acetaminophen (TYLENOL) 500 MG tablet   Yes Yes   Sig: Take 2 Tablets (1,000 mg) by mouth three times a day. 24 hour limit of acetaminophen (TYLENOL) is 4000mg. Indications: Pain   albuterol (PROVENTIL) (2.5 MG/3ML) 0.083% neb solution   Yes Yes   Sig: Inhale 1 Each (2.5 mg) every 4 hours as needed for Wheezing.   apixaban ANTICOAGULANT (ELIQUIS) 2.5 MG tablet   Yes Yes   Sig: Take 1 Tablet (2.5 mg) by mouth two times a day.   atenolol (TENORMIN) 25 MG tablet   Yes Yes   Sig: Take 25 mg by mouth 2 times daily.   clomiPRAMINE (ANAFRANIL) 50 MG capsule   Yes Yes    Sig: Take 1 Capsule (50 mg) by mouth daily at bedtime. Indications: Obsessive Compulsive Disorder   diclofenac (VOLTAREN) 1 % topical gel   Yes Yes   Sig: Apply 4 g topically 4 times daily.   fluticasone (FLONASE) 50 MCG/ACT nasal spray   Yes Yes   Sig: Spray 1 spray into both nostrils daily.   furosemide (LASIX) 20 MG tablet   Yes Yes   Sig: Take 20 mg by mouth 2 times daily.   ketoconazole (NIZORAL) 2 % external cream   Yes Yes   Sig: Apply topically 2 times daily.   lamoTRIgine (LAMICTAL) 200 MG tablet   Yes Yes   Sig: Take 200 mg by mouth 2 times daily.   loperamide (IMODIUM) 2 MG capsule   Yes Yes   Sig: Take 2 mg by mouth 4 times daily as needed for diarrhea.   melatonin 1 MG TABS tablet   Yes Yes   Sig: Take 0.5-2 mg by mouth at bedtime. May take 15 mg sometimes.   montelukast (SINGULAIR) 10 MG tablet   Yes Yes   Sig: Take 10 mg by mouth daily.   multivitamin w/minerals (THERA-VIT-M) tablet   Yes Yes   Sig: Take 1 tablet by mouth daily.   olopatadine (PATANOL) 0.1 % ophthalmic solution   Yes Yes   Sig: Place 1 Drop into both eyes two times a day.   omeprazole (PRILOSEC) 20 MG DR capsule   Yes Yes   Sig: Take 1 Capsule (20 mg) by mouth two times a day before meals. Take 1 hour before meals   pimecrolimus (ELIDEL) 1 % external cream   Yes Yes   Sig: Apply topically BID to rash on trunk and body fold areas.   polyethylene glycol (MIRALAX) 17 GM/Dose powder   Yes Yes   Sig: Take 17 g by mouth daily.   psyllium (METAMUCIL/KONSYL) 0.52 g capsule   Yes Yes   Sig: Take 1 Capsule (0.52 g) by mouth daily.   pyridOXINE (VITAMIN B6) 100 MG TABS   Yes Yes   Sig: Take 100 mg by mouth daily.   rosuvastatin (CRESTOR) 20 MG tablet   Yes Yes   Sig: TAKE 1 TABLET(20 MG) BY MOUTH DAILY AT BEDTIME   triamcinolone (KENALOG) 0.1 % external cream   Yes Yes   Sig: Apply topically to affected areas on body BID for 1-3 weeks. Do not use on face, body folds or genitals.      Facility-Administered Medications: None         Physical  Exam   Vital Signs: Temp: 98.1  F (36.7  C) Temp src: Oral BP: 120/69 Pulse: 80   Resp: 16 SpO2: 96 % O2 Device: None (Room air)    Weight: 0 lbs 0 oz    GENERAL: Alert and awake. Answering questions inappropriately, disorganized. Oriented x 3 however very tangential. NAD. Pleasant and conversational   CARDIOVASCULAR: RRR. S1, S2. No murmurs, rubs, or gallops.   RESPIRATORY: Effort normal on RA. Coarse lung sounds to RLL   GI: Abdomen soft, non-tender abdomen without rebound or guarding, normoactive bowel sounds present  MUSCULOSKELETAL: Moves all extremities.   EXTREMITIES: No peripheral edema. No calf asymmetry, erythema, or tenderness.   NEUROLOGICAL: No focal deficits. Moving all extremities symmetrically.   SKIN: Intact. Warm and dry. No jaundice. No rashes on exposed skin   PSYCH: Affect appropriate     Medical Decision Making       75 MINUTES SPENT BY ME on the date of service doing chart review, history, exam, documentation & further activities per the note.      Data   Imaging results reviewed over the past 24 hrs:   Recent Results (from the past 24 hours)   XR Chest Port 1 View    Narrative    Portable chest    INDICATION: COVID    COMPARISON: None    Heart size upper normal. Left subclavian transvenous approach dual  lead pacemaker. Bilateral reverse total shoulder osseous. Patchy and  streaky densities right lower lung zone measuring for infection as  opposed to edema or atelectasis. There is mild right costophrenic  angle blunting.      Impression    IMPRESSION: Possible infection versus edema/trace right pleural  effusion at right lung base. Pacemaker. Bilateral reverse total  shoulder arthroplasty.    REBEL MUIR MD         SYSTEM ID:  [id number]     Recent Labs   Lab 06/11/25  0929 06/11/25  0918   WBC  --  7.1   HGB  --  13.1   MCV  --  91   PLT  --  363   NA  --  138   POTASSIUM  --  3.8   CHLORIDE  --  99   CO2  --  25   BUN  --  19.1   CR  --  0.88   ANIONGAP  --  14   KHUSHI  --  10.2   GLC  194* 197*   ALBUMIN  --  4.6   PROTTOTAL  --  7.6   BILITOTAL  --  0.6   ALKPHOS  --  106   ALT  --  30   AST  --  52*
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
## 2025-06-11 NOTE — PROGRESS NOTES
Pt transported to [facility] via [facility]. Pt aware of 72 hour hold with paper work provided

## 2025-06-11 NOTE — PROGRESS NOTES
Patient arrives to 518 from [facility] at 1705. Patient is calm on arrival with EMS, mostly cooperative in transit. Orientated to room upon arrival. Tolerated transfer

## 2025-06-11 NOTE — ED PROVIDER NOTES
[place] EMERGENCY DEPARTMENT ([facility])    6/11/25       ED PROVIDER NOTE      History     Chief Complaint   Patient presents with    Altered Mental Status     HPI  Dominique Harkins is a 79-year-old female with a history of mental health issues including bipolar disorder, borderline personality disorder, dependent personality disorder, and has a history of paroxysmal A-fib with previous CVA and chronic anticoagulation, as well as COPD and CHF, who presents to the emergency department by ambulance after the patient was found in the seat of the 's car wanting to get some tests at the hospital but with obvious psychotic behavior.  The paramedics arrived finding the patient rambling in speech and not making sense, gave the patient 10 mg of Zyprexa orally and transported her to the ER for further evaluation.  Here the patient is a very poor historian with tangential speech not making sense, not answering questions appropriately.     finally arrives to the ER stating the patient just got out of [facility] approximately 4 to 7 days ago after being hospitalized for 1 month there for her mental health issues.   states the patient contracted COVID while at [facility] approximately 2 weeks ago but was sent home to him to manage a problem 4 days ago.  Patient apparently had been on some new medications or her medications had been adjusted but the  states he cannot handle her at home which precipitated the 911 call    Past Medical History  No past medical history on file.    Past Surgical History:   Procedure Laterality Date    IR LUMBAR PUNCTURE  12/10/2024    IR LUMBAR PUNCTURE  11/16/2023    IR LUMBAR PUNCTURE  8/24/2023    IR LUMBAR PUNCTURE  8/5/2022    IR LUMBAR PUNCTURE  7/7/2022    IR LUMBAR PUNCTURE  11/12/2021    IR LUMBAR PUNCTURE  3/2/2021    IR LUMBAR PUNCTURE  1/28/2021    IR LUMBAR PUNCTURE  12/22/2020    IR LUMBAR PUNCTURE  9/30/2020    IR LUMBAR PUNCTURE  4/18/2016     IR LUMBAR PUNCTURE  3/28/2016    IR LUMBAR PUNCTURE  12/31/2015    IR LUMBAR PUNCTURE  12/17/2015     dulaglutide (TRULICITY) 0.75 MG/0.5ML pen  melatonin 5 MG tablet      Allergies   Allergen Reactions    Other Drug Allergy (See Comments)      Antihistamines.     Family History  No family history on file.    Social History       Past medical history, past surgical history, medications, allergies, family history, and social history were reviewed with the patient. No additional pertinent items.   A complete review of systems was performed with pertinent positives and negatives noted in the HPI, and all other systems negative.    Physical Exam   BP: 123/66  Pulse: 80  Temp: 97.9  F (36.6  C)  Resp: 18  SpO2: 100 %  Physical Exam  Vitals and nursing note reviewed.   Constitutional:       Comments: Awake and alert having tangential speech with obvious delusions   HENT:      Head: Atraumatic.   Eyes:      Extraocular Movements: Extraocular movements intact.      Pupils: Pupils are equal, round, and reactive to light.   Cardiovascular:      Rate and Rhythm: Regular rhythm.   Pulmonary:      Comments: Breath sounds bilaterally with no wheezes or rales  Abdominal:      Tenderness: There is no abdominal tenderness.   Musculoskeletal:         General: No tenderness.      Cervical back: Neck supple.   Neurological:      Mental Status: She is alert and oriented to person, place, and time.   Psychiatric:      Comments: Erratic, tangential, nonsensical statements and delusions           ED Course, Procedures, & Data      Orders Placed This Encounter   Procedures    XR Chest Port 1 View    [place] Draw    CBC with platelets and differential    Extra Blue Top Tube    Extra Red Top Tube    Extra Green Top (Lithium Heparin) ON ICE    Comprehensive metabolic panel    Ethanol Level Blood    UA with Microscopic reflex to Culture    Influenza A/B, RSV and SARS-CoV2 PCR (COVID-19)    NT-proBNP    Glucose by meter    Peripheral IV catheter     Catheter Site Care    Psychiatry IP Consult: management; Consultant may enter orders: Yes; Requesting provider? ED Provider    Special Precautions    Urine Culture    CBC with platelets differential       Procedures        Results for orders placed or performed during the hospital encounter of 06/11/25   XR Chest Port 1 View     Status: None    Narrative    Portable chest    INDICATION: COVID    COMPARISON: None    Heart size upper normal. Left subclavian transvenous approach dual  lead pacemaker. Bilateral reverse total shoulder osseous. Patchy and  streaky densities right lower lung zone measuring for infection as  opposed to edema or atelectasis. There is mild right costophrenic  angle blunting.      Impression    IMPRESSION: Possible infection versus edema/trace right pleural  effusion at right lung base. Pacemaker. Bilateral reverse total  shoulder arthroplasty.    REBEL MUIR MD         SYSTEM ID:  [id number]   [place] Draw     Status: None (In process)    Narrative    The following orders were created for panel order [place] Draw.  Procedure                               Abnormality         Status                     ---------                               -----------         ------                     Extra Blue Top Tube[[id number]]                             In process                 Extra Red Top Tube[[id number]]                              In process                 Extra Green Top (Lithiu...[[id number]]                      In process                   Please view results for these tests on the individual orders.   CBC with platelets and differential     Status: None   Result Value Ref Range    WBC Count 7.1 4.0 - 11.0 10e3/uL    RBC Count 4.47 3.80 - 5.20 10e6/uL    Hemoglobin 13.1 11.7 - 15.7 g/dL    Hematocrit 40.5 35.0 - 47.0 %    MCV 91 78 - 100 fL    MCH 29.3 26.5 - 33.0 pg    MCHC 32.3 31.5 - 36.5 g/dL    RDW 12.7 10.0 - 15.0 %    Platelet Count 363 150 - 450 10e3/uL    % Neutrophils  61 %    % Lymphocytes 29 %    % Monocytes 9 %    % Eosinophils 1 %    % Basophils 0 %    % Immature Granulocytes 0 %    NRBCs per 100 WBC 0 <1 /100    Absolute Neutrophils 4.3 1.6 - 8.3 10e3/uL    Absolute Lymphocytes 2.1 0.8 - 5.3 10e3/uL    Absolute Monocytes 0.6 0.0 - 1.3 10e3/uL    Absolute Eosinophils 0.1 0.0 - 0.7 10e3/uL    Absolute Basophils 0.0 0.0 - 0.2 10e3/uL    Absolute Immature Granulocytes 0.0 <=0.4 10e3/uL    Absolute NRBCs 0.0 10e3/uL   Comprehensive metabolic panel     Status: Abnormal   Result Value Ref Range    Sodium 138 135 - 145 mmol/L    Potassium 3.8 3.4 - 5.3 mmol/L    Carbon Dioxide (CO2) 25 22 - 29 mmol/L    Anion Gap 14 7 - 15 mmol/L    Urea Nitrogen 19.1 8.0 - 23.0 mg/dL    Creatinine 0.88 0.51 - 0.95 mg/dL    GFR Estimate 67 >60 mL/min/1.73m2    Calcium 10.2 8.8 - 10.4 mg/dL    Chloride 99 98 - 107 mmol/L    Glucose 197 (H) 70 - 99 mg/dL    Alkaline Phosphatase 106 40 - 150 U/L    AST 52 (H) 0 - 45 U/L    ALT 30 0 - 50 U/L    Protein Total 7.6 6.4 - 8.3 g/dL    Albumin 4.6 3.5 - 5.2 g/dL    Bilirubin Total 0.6 <=1.2 mg/dL   Ethanol Level Blood     Status: Normal   Result Value Ref Range    Ethanol Level Blood <0.01 <=0.01 g/dL   UA with Microscopic reflex to Culture     Status: Abnormal    Specimen: Urine, Midstream   Result Value Ref Range    Color Urine Light Yellow Colorless, Straw, Light Yellow, Yellow    Appearance Urine Slightly Cloudy (A) Clear    Glucose Urine Negative Negative mg/dL    Bilirubin Urine Negative Negative    Ketones Urine Negative Negative mg/dL    Specific Gravity Urine 1.011 1.003 - 1.035    Blood Urine Negative Negative    pH Urine 5.5 5.0 - 7.0    Protein Albumin Urine Negative Negative mg/dL    Urobilinogen Urine Normal Normal mg/dL    Nitrite Urine Negative Negative    Leukocyte Esterase Urine Large (A) Negative    Bacteria Urine Few (A) None Seen /HPF    RBC Urine 3 (H) <=2 /HPF    WBC Urine 22 (H) <=5 /HPF    Squamous Epithelials Urine 8 (H) <=1 /HPF     Transitional Epithelials Urine 2 (H) <=1 /HPF    Hyaline Casts Urine 12 (H) <=2 /LPF    Narrative    Urine Culture ordered based on laboratory criteria   Influenza A/B, RSV and SARS-CoV2 PCR (COVID-19) Nose     Status: Abnormal    Specimen: Nose; Swab   Result Value Ref Range    Influenza A PCR Negative Negative    Influenza B PCR Negative Negative    RSV PCR Negative Negative    SARS CoV2 PCR Positive (A) Negative    Narrative    Testing was performed using the Xpert Xpress CoV2/Flu/RSV Assay on the Cepheid GeneXpert Instrument. This test should be ordered for the detection of SARS-CoV2, influenza, and RSV viruses in individuals with signs and symptoms of respiratory tract infection. This test is for in vitro diagnostic use under the [place] FDA for laboratories certified under CLIA to perform high or moderate complexity testing. This test has been [place] FDA cleared. A negative result does not rule out the presence of PCR inhibitors in the specimen or target RNA in concentration below the limit of detection for the assay. If only one viral target is positive but coinfection with multiple targets is suspected, the sample should be re-tested with another FDA cleared, approved, or authorized test, if coninfection would change clinical management. This test was validated by the [facility]. These laboratories are certified under the Clinical Laboratory Improvement Amendments of 1988 (CLIA-88) as qualified to perfom high complexity laboratory testing.   NT-proBNP     Status: Normal   Result Value Ref Range    NT-proBNP 592 0 - 624 pg/mL   Glucose by meter     Status: Abnormal   Result Value Ref Range    GLUCOSE BY METER POCT 194 (H) 70 - 99 mg/dL   CBC with platelets differential     Status: None    Narrative    The following orders were created for panel order CBC with platelets differential.  Procedure                               Abnormality         Status                     ---------                                -----------         ------                     CBC with platelets and ...[[id number]]                      Final result                 Please view results for these tests on the individual orders.   CBC with platelets differential *Canceled*     Status: None ()    Narrative    The following orders were created for panel order CBC with platelets differential.  Procedure                               Abnormality         Status                     ---------                               -----------         ------                       Please view results for these tests on the individual orders.   CBC with platelets differential *Canceled*     Status: None ()    Narrative    The following orders were created for panel order CBC with platelets differential.  Procedure                               Abnormality         Status                     ---------                               -----------         ------                     CBC with platelets and ...[[id number]]                                                   Please view results for these tests on the individual orders.     Medications   sodium chloride (PF) 0.9% PF flush 3 mL (has no administration in time range)   sodium chloride (PF) 0.9% PF flush 3 mL (has no administration in time range)   LORazepam (ATIVAN) injection 1 mg (has no administration in time range)         Critical care was not performed.     Medical Decision Making  The patient's presentation was of high complexity (a chronic illness severe exacerbation, progression, or side effect of treatment).    The patient's evaluation involved:  review of external note(s) from 1 sources (see epic)  ordering and/or review of 3+ test(s) in this encounter (see above)    The patient's management necessitated high risk (a decision regarding hospitalization).    Assessment & Plan      I have reviewed the nursing notes.     Case was discussed with the medicine service because of the patient's right  lower lobe infiltrate her COVID illness and her uncontrolled psychosis and they requested I talk with the mental health unit to see if they would be willing to take the patient on their service.    Patient will be discussed with the mental health providers and eventually will be admitted to either mental health or to the medicine service for further control of her acute psychosis.      Final diagnoses:   Acute psychosis (H) - decompensated   Pneumonia due to COVID-19 virus     Admit    El Marques MD  [facility] EMERGENCY DEPARTMENT  6/11/2025     El Marques MD  06/11/25 1132

## 2025-06-11 NOTE — PLAN OF CARE
Goal Outcome Evaluation:      Plan of Care Reviewed With: patient    Overall Patient Progress: no changeOverall Patient Progress: no change    Outcome Evaluation: patient arrived this shift from [place] ED. patient arrived calm and pleasant. fell asleep shortly after arrival. patient woke up when assessment and vitals being completed. initally was aggitated by IV and asked to call .  was called and eventually patient calmed down some. patient stable on room air. lung sounds course on right side. has a frequent cough. denies chest pain and shortness of breath. patient complaining of pain from IV during antibitoic infusion. no signs of infiltration.was able to finish antibiotic once patient was distracted.     Mariela Whitaker RN on 6/11/2025 at 7:13 PM

## 2025-06-12 ENCOUNTER — TELEPHONE (OUTPATIENT)
Dept: BEHAVIORAL HEALTH | Facility: CLINIC | Age: 80
End: 2025-06-12
Payer: COMMERCIAL

## 2025-06-12 VITALS
SYSTOLIC BLOOD PRESSURE: 121 MMHG | RESPIRATION RATE: 16 BRPM | BODY MASS INDEX: 31.17 KG/M2 | TEMPERATURE: 98.6 F | WEIGHT: 198.6 LBS | DIASTOLIC BLOOD PRESSURE: 51 MMHG | HEART RATE: 63 BPM | HEIGHT: 67 IN | OXYGEN SATURATION: 93 %

## 2025-06-12 LAB
ALBUMIN SERPL BCG-MCNC: 3.8 G/DL (ref 3.5–5.2)
ALP SERPL-CCNC: 95 U/L (ref 40–150)
ALT SERPL W P-5'-P-CCNC: 24 U/L (ref 0–50)
ANION GAP SERPL CALCULATED.3IONS-SCNC: 15 MMOL/L (ref 7–15)
AST SERPL W P-5'-P-CCNC: 43 U/L (ref 0–45)
ATRIAL RATE - MUSE: 66 BPM
ATRIAL RATE - MUSE: 78 BPM
BACTERIA UR CULT: NORMAL
BILIRUB SERPL-MCNC: 0.5 MG/DL
BUN SERPL-MCNC: 16.8 MG/DL (ref 8–23)
CALCIUM SERPL-MCNC: 9.1 MG/DL (ref 8.8–10.4)
CHLORIDE SERPL-SCNC: 100 MMOL/L (ref 98–107)
CREAT SERPL-MCNC: 0.9 MG/DL (ref 0.51–0.95)
DIASTOLIC BLOOD PRESSURE - MUSE: NORMAL MMHG
DIASTOLIC BLOOD PRESSURE - MUSE: NORMAL MMHG
EGFRCR SERPLBLD CKD-EPI 2021: 65 ML/MIN/1.73M2
ERYTHROCYTE [DISTWIDTH] IN BLOOD BY AUTOMATED COUNT: 12.8 % (ref 10–15)
EST. AVERAGE GLUCOSE BLD GHB EST-MCNC: 186 MG/DL
GLUCOSE BLDC GLUCOMTR-MCNC: 156 MG/DL (ref 70–99)
GLUCOSE BLDC GLUCOMTR-MCNC: 172 MG/DL (ref 70–99)
GLUCOSE BLDC GLUCOMTR-MCNC: 198 MG/DL (ref 70–99)
GLUCOSE BLDC GLUCOMTR-MCNC: 213 MG/DL (ref 70–99)
GLUCOSE BLDC GLUCOMTR-MCNC: 252 MG/DL (ref 70–99)
GLUCOSE SERPL-MCNC: 191 MG/DL (ref 70–99)
HBA1C MFR BLD: 8.1 %
HCO3 SERPL-SCNC: 23 MMOL/L (ref 22–29)
HCT VFR BLD AUTO: 36.4 % (ref 35–47)
HGB BLD-MCNC: 11.9 G/DL (ref 11.7–15.7)
INTERPRETATION ECG - MUSE: NORMAL
INTERPRETATION ECG - MUSE: NORMAL
MCH RBC QN AUTO: 29.8 PG (ref 26.5–33)
MCHC RBC AUTO-ENTMCNC: 32.7 G/DL (ref 31.5–36.5)
MCV RBC AUTO: 91 FL (ref 78–100)
P AXIS - MUSE: NORMAL DEGREES
P AXIS - MUSE: NORMAL DEGREES
PLATELET # BLD AUTO: 316 10E3/UL (ref 150–450)
POTASSIUM SERPL-SCNC: 3.9 MMOL/L (ref 3.4–5.3)
PR INTERVAL - MUSE: 348 MS
PR INTERVAL - MUSE: NORMAL MS
PROCALCITONIN SERPL IA-MCNC: 0.1 NG/ML
PROT SERPL-MCNC: 6.3 G/DL (ref 6.4–8.3)
QRS DURATION - MUSE: 162 MS
QRS DURATION - MUSE: 166 MS
QT - MUSE: 446 MS
QT - MUSE: 464 MS
QTC - MUSE: 486 MS
QTC - MUSE: 501 MS
R AXIS - MUSE: -68 DEGREES
R AXIS - MUSE: -71 DEGREES
RBC # BLD AUTO: 4 10E6/UL (ref 3.8–5.2)
SODIUM SERPL-SCNC: 138 MMOL/L (ref 135–145)
SYSTOLIC BLOOD PRESSURE - MUSE: NORMAL MMHG
SYSTOLIC BLOOD PRESSURE - MUSE: NORMAL MMHG
T AXIS - MUSE: 105 DEGREES
T AXIS - MUSE: 97 DEGREES
VENTRICULAR RATE- MUSE: 66 BPM
VENTRICULAR RATE- MUSE: 76 BPM
WBC # BLD AUTO: 6.4 10E3/UL (ref 4–11)

## 2025-06-12 PROCEDURE — 82435 ASSAY OF BLOOD CHLORIDE: CPT

## 2025-06-12 PROCEDURE — 93005 ELECTROCARDIOGRAM TRACING: CPT

## 2025-06-12 PROCEDURE — 250N000012 HC RX MED GY IP 250 OP 636 PS 637

## 2025-06-12 PROCEDURE — 250N000013 HC RX MED GY IP 250 OP 250 PS 637

## 2025-06-12 PROCEDURE — 93010 ELECTROCARDIOGRAM REPORT: CPT | Performed by: INTERNAL MEDICINE

## 2025-06-12 PROCEDURE — 83036 HEMOGLOBIN GLYCOSYLATED A1C: CPT

## 2025-06-12 PROCEDURE — 250N000009 HC RX 250

## 2025-06-12 PROCEDURE — 36415 COLL VENOUS BLD VENIPUNCTURE: CPT

## 2025-06-12 PROCEDURE — 94640 AIRWAY INHALATION TREATMENT: CPT

## 2025-06-12 PROCEDURE — 250N000013 HC RX MED GY IP 250 OP 250 PS 637: Performed by: INTERNAL MEDICINE

## 2025-06-12 PROCEDURE — 120N000002 HC R&B MED SURG/OB UMMC

## 2025-06-12 PROCEDURE — 99207 PR NO BILLABLE SERVICE THIS VISIT: CPT

## 2025-06-12 PROCEDURE — 99233 SBSQ HOSP IP/OBS HIGH 50: CPT

## 2025-06-12 PROCEDURE — 999N000157 HC STATISTIC RCP TIME EA 10 MIN

## 2025-06-12 PROCEDURE — 84145 PROCALCITONIN (PCT): CPT | Performed by: INTERNAL MEDICINE

## 2025-06-12 PROCEDURE — 85048 AUTOMATED LEUKOCYTE COUNT: CPT

## 2025-06-12 PROCEDURE — 99232 SBSQ HOSP IP/OBS MODERATE 35: CPT | Performed by: INTERNAL MEDICINE

## 2025-06-12 RX ORDER — CLOMIPRAMINE HYDROCHLORIDE 25 MG/1
25 CAPSULE ORAL AT BEDTIME
Status: DISCONTINUED | OUTPATIENT
Start: 2025-06-12 | End: 2025-06-18

## 2025-06-12 RX ORDER — QUETIAPINE FUMARATE 50 MG/1
100 TABLET, FILM COATED ORAL AT BEDTIME
Status: DISCONTINUED | OUTPATIENT
Start: 2025-06-12 | End: 2025-06-19

## 2025-06-12 RX ORDER — CLONAZEPAM 0.5 MG/1
0.5 TABLET ORAL AT BEDTIME
Status: DISCONTINUED | OUTPATIENT
Start: 2025-06-12 | End: 2025-06-20

## 2025-06-12 RX ORDER — AZITHROMYCIN 500 MG/1
500 TABLET, FILM COATED ORAL ONCE
Status: COMPLETED | OUTPATIENT
Start: 2025-06-12 | End: 2025-06-12

## 2025-06-12 RX ORDER — AZITHROMYCIN 250 MG/1
250 TABLET, FILM COATED ORAL DAILY
Status: COMPLETED | OUTPATIENT
Start: 2025-06-13 | End: 2025-06-16

## 2025-06-12 RX ADMIN — POLYETHYLENE GLYCOL 3350 17 G: 17 POWDER, FOR SOLUTION ORAL at 08:03

## 2025-06-12 RX ADMIN — ACETAMINOPHEN 650 MG: 325 TABLET, FILM COATED ORAL at 14:32

## 2025-06-12 RX ADMIN — ROSUVASTATIN 20 MG: 20 TABLET, FILM COATED ORAL at 19:54

## 2025-06-12 RX ADMIN — ATENOLOL 25 MG: 25 TABLET ORAL at 10:28

## 2025-06-12 RX ADMIN — DICLOFENAC SODIUM TOPICAL GEL, 1% 4 G: 10 GEL TOPICAL at 19:53

## 2025-06-12 RX ADMIN — APIXABAN 2.5 MG: 2.5 TABLET, FILM COATED ORAL at 19:54

## 2025-06-12 RX ADMIN — RISPERIDONE 0.5 MG: 0.5 TABLET, FILM COATED ORAL at 08:03

## 2025-06-12 RX ADMIN — OLOPATADINE HYDROCHLORIDE 1 DROP: 1 SOLUTION OPHTHALMIC at 19:54

## 2025-06-12 RX ADMIN — INSULIN GLARGINE 13 UNITS: 100 INJECTION, SOLUTION SUBCUTANEOUS at 19:54

## 2025-06-12 RX ADMIN — Medication 1 CAPSULE: at 08:02

## 2025-06-12 RX ADMIN — Medication 1 MG: at 23:39

## 2025-06-12 RX ADMIN — OLOPATADINE HYDROCHLORIDE 1 DROP: 1 SOLUTION OPHTHALMIC at 08:16

## 2025-06-12 RX ADMIN — LAMOTRIGINE 200 MG: 200 TABLET ORAL at 19:55

## 2025-06-12 RX ADMIN — INSULIN ASPART 2 UNITS: 100 INJECTION, SOLUTION INTRAVENOUS; SUBCUTANEOUS at 22:16

## 2025-06-12 RX ADMIN — Medication 25 MCG: at 08:02

## 2025-06-12 RX ADMIN — PANTOPRAZOLE SODIUM 40 MG: 40 TABLET, DELAYED RELEASE ORAL at 08:02

## 2025-06-12 RX ADMIN — FUROSEMIDE 20 MG: 20 TABLET ORAL at 16:16

## 2025-06-12 RX ADMIN — PANTOPRAZOLE SODIUM 40 MG: 40 TABLET, DELAYED RELEASE ORAL at 16:16

## 2025-06-12 RX ADMIN — DICLOFENAC SODIUM TOPICAL GEL, 1% 4 G: 10 GEL TOPICAL at 16:20

## 2025-06-12 RX ADMIN — INSULIN ASPART 2 UNITS: 100 INJECTION, SOLUTION INTRAVENOUS; SUBCUTANEOUS at 08:11

## 2025-06-12 RX ADMIN — QUETIAPINE FUMARATE 25 MG: 25 TABLET ORAL at 18:12

## 2025-06-12 RX ADMIN — FUROSEMIDE 20 MG: 20 TABLET ORAL at 08:02

## 2025-06-12 RX ADMIN — CLOMIPRAMINE HYDROCHLORIDE 25 MG: 25 CAPSULE ORAL at 22:17

## 2025-06-12 RX ADMIN — INSULIN ASPART 1 UNITS: 100 INJECTION, SOLUTION INTRAVENOUS; SUBCUTANEOUS at 18:36

## 2025-06-12 RX ADMIN — APIXABAN 2.5 MG: 2.5 TABLET, FILM COATED ORAL at 08:02

## 2025-06-12 RX ADMIN — QUETIAPINE FUMARATE 25 MG: 25 TABLET ORAL at 06:26

## 2025-06-12 RX ADMIN — AZITHROMYCIN DIHYDRATE 500 MG: 500 TABLET ORAL at 14:32

## 2025-06-12 RX ADMIN — LAMOTRIGINE 200 MG: 200 TABLET ORAL at 10:28

## 2025-06-12 RX ADMIN — QUETIAPINE FUMARATE 100 MG: 50 TABLET ORAL at 22:17

## 2025-06-12 RX ADMIN — PIMECROLIMUS: 10 CREAM TOPICAL at 19:54

## 2025-06-12 RX ADMIN — INSULIN ASPART 2 UNITS: 100 INJECTION, SOLUTION INTRAVENOUS; SUBCUTANEOUS at 12:41

## 2025-06-12 RX ADMIN — RISPERIDONE 1 MG: 1 TABLET, FILM COATED ORAL at 22:17

## 2025-06-12 RX ADMIN — ATENOLOL 25 MG: 25 TABLET ORAL at 19:54

## 2025-06-12 RX ADMIN — RISPERIDONE 0.5 MG: 0.5 TABLET, FILM COATED ORAL at 06:10

## 2025-06-12 RX ADMIN — CLONAZEPAM 0.5 MG: 0.5 TABLET ORAL at 22:17

## 2025-06-12 RX ADMIN — DICLOFENAC SODIUM TOPICAL GEL, 1% 4 G: 10 GEL TOPICAL at 08:03

## 2025-06-12 RX ADMIN — PIMECROLIMUS: 10 CREAM TOPICAL at 08:03

## 2025-06-12 RX ADMIN — ALBUTEROL SULFATE 2.5 MG: 2.5 SOLUTION RESPIRATORY (INHALATION) at 05:47

## 2025-06-12 RX ADMIN — MONTELUKAST 10 MG: 10 TABLET, FILM COATED ORAL at 08:02

## 2025-06-12 ASSESSMENT — ACTIVITIES OF DAILY LIVING (ADL)
ADLS_ACUITY_SCORE: 56
ADLS_ACUITY_SCORE: 46
ADLS_ACUITY_SCORE: 46
ADLS_ACUITY_SCORE: 56
ADLS_ACUITY_SCORE: 56
ADLS_ACUITY_SCORE: 46
ADLS_ACUITY_SCORE: 56
ADLS_ACUITY_SCORE: 46
ADLS_ACUITY_SCORE: 56
ADLS_ACUITY_SCORE: 56
ADLS_ACUITY_SCORE: 46
ADLS_ACUITY_SCORE: 46
ADLS_ACUITY_SCORE: 56
ADLS_ACUITY_SCORE: 46
ADLS_ACUITY_SCORE: 56

## 2025-06-12 NOTE — TELEPHONE ENCOUNTER
S: 5 [facility], [phone], Zora Larson calling at 3:32 PM about 79 year old/female presenting with psychosis.      B: Pt arrived via Family. Presenting problem, stressors: Pt came in yesterday for evaluation of psychosis. Admitted to Medicine dt UTI and COVID precautions.  Poison Control cleared her for COVID precautions.  Pt is medically cleared and ready to go to psych for shalonda and psychotic features.     Pt affect in ED: Manic, needs direction and redirectable.   Pt Dx: Bipolar Disorder  Previous IPMH hx? Yes: she was just at [facility] in May for psychiatry.   Pt denies SI   Hx of suicide attempt? No  Pt denies SIB  Pt denies HI   Pt denies hallucinations .   Pt RARS Score: 3    Hx of aggression/violence, sexual offenses, legal concerns, Epic care plan? describe: None  Current concerns for aggression this visit? No  Does pt have a history of Civil Commitment? No  Is Pt their own guardian? Yes    Pt is prescribed medication. Is patient medication compliant? Yes  Pt endorses OP services: Psychiatrist  CD concerns: None  Acute or chronic medical concerns: None  Does Pt present with specific needs, assistive devices, or exclusionary criteria? None      Pt is ambulatory  Pt is able to perform ADLs independently      A: Pt to be reviewed for IP admission. 72HH is discontinued.  She is voluntary.   Preferred placement: [facility]    COVID Symptoms: No  If yes, COVID test required   Utox: Not ordered, intake to request lab    CMP: Abnormalities: Glucose 191, Protein 6.3,   CBC: WNL  HCG: N/A  GLUCOSE: Glucose 213    R: Patient cleared and ready for behavioral bed placement: Yes  Pt placed on IP worklist? Yes    Does Patient need a Transfer Center request created? No, Pt is located within [facility] ED, [facility] ED, or [facility] Access Inpatient Bed Call Log 6/12/25 at 6 PM:   Intake has called facilities that have not updated the bed status within the last 12 hours.         Pt is on medical unit.  [facility] only.         (Adults)    [facility] is posting 0 beds.           [facility] is posting 0 beds. [phone]; Per Call At 4:27 PM, At Capacity.  [facility] is posting 0 beds. Negative covid required.[phone].   [facility] is posting 0 beds. Neg covid. No high school/[facility]-psych. [phone] Per Call At 4:28 PM, per Alissa, At Capacity.   [facility] is posting 0 beds. [phone]   [facility] is posting 0 beds. [phone]    [facility] is posting 6 beds. Negative covid. [phone]; Per Call At 4:29 PM, they have 1 Adult and handful of Adol beds M/F.   [facility] is posting 0 beds. [phone].   [facility] is posting 0 beds [phone].     Pt remains on waitlist pending appropriate bed availability.

## 2025-06-12 NOTE — CONSULTS
COVID positive (5/31/2025)   Chronic pulmonary infiltrates with prior recurrent pneumonia, suspected to be d/t chronic aspiration. Pt not immunocompromised, no fever. Patient meets criteria for Covid Recovered.

## 2025-06-12 NOTE — PLAN OF CARE
Goal Outcome Evaluation:      Plan of Care Reviewed With: patient    Overall Patient Progress: no changeOverall Patient Progress: no change    Outcome Evaluation: Patient can answer orientation questions. Fixated on delusions. Told writer to feel her heart racing. Writer listen and had sitter do a set of vitals. VSS. Patient would talk about her highly contagious HIV. No diagnosis of HIV found in patient chart. When awake patient remains hyper-verbal, anxious and restless. Refused skin assessment this shift. Cough was infrequent at start of shift. Later on cough was more frequent. PRN Neb given by RT.     0500: Patient requesting for 8am Seroquel earlier. Became more anxious, agitated. Provider page if dose can be given earlier than 8am. PRN risperidone given per MAR in the mean time. Patient continues to state it did not do anything and continues to request for Seroquel. Received the okay to give by hospitalist. 8am Seroquel given     Sitter remain in room for safety.  Patient on 72 hour hold.     Continue to monitor POC.

## 2025-06-12 NOTE — CONSULTS
IP MH Referral Acuity Rating Score (RARS)    LMHP complete at referral to IP MH, with DEC; and, daily while awaiting IP MH placement. Call score to PPS.  CRITERIA SCORING   New 72 HH and Involuntary for IP MH (not adolescent) 0/3   Boarding over 24 hours 0/1   Vulnerable adult at least 55+ with multiple co morbidities; or, Patient age 11 or under 1/1   Suicide ideation without relief of precipitating factors 0/1   Current plan for suicide 0/1   Current plan for homicide 0/1   Imminent risk or actual attempt to seriously harm another without relief of factors precipitating the attempt 0/1   Severe dysfunction in daily living (ex: complete neglect for self care, extreme disruption in vegetative function, extreme deterioration in social interactions) 1/1   Recent (last 2 weeks) or current physical aggression in the ED 0/1   Restraints or seclusion episode in ED 0/1   Verbal aggression, agitation, yelling, etc., while in the ED 0/1   Active psychosis with psychomotor agitation or catatonia 0/1   Need for constant or near constant redirection (from leaving, from others, etc).  1/1   Intrusive or disruptive behaviors 1/1   TOTAL 4
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
## 2025-06-12 NOTE — PLAN OF CARE
"Goal Outcome Evaluation:    Plan of Care Reviewed With: patient  Overall Patient Progress: no changeOverall Patient Progress: no change    Off covid precautions today per Sandy   72HH removed today. If patient does attempt to leave she is holdable per Zora Goldman remains at bedside for patient safety   Blood sugars done ACHS   PIV removed today, painful at insertion site and leaking around dressing  Manic at times with pressured speech and tangential thoughts - Patient having repeated thoughts about weight loss stating \"she lost too much to fast and that why my stomach looks like this,\" while shaking her stomach and laughing          @[facility](1)@        "

## 2025-06-13 LAB
ATRIAL RATE - MUSE: 66 BPM
DIASTOLIC BLOOD PRESSURE - MUSE: NORMAL MMHG
GLUCOSE BLDC GLUCOMTR-MCNC: 116 MG/DL (ref 70–99)
GLUCOSE BLDC GLUCOMTR-MCNC: 163 MG/DL (ref 70–99)
GLUCOSE BLDC GLUCOMTR-MCNC: 177 MG/DL (ref 70–99)
GLUCOSE BLDC GLUCOMTR-MCNC: 197 MG/DL (ref 70–99)
GLUCOSE BLDC GLUCOMTR-MCNC: 205 MG/DL (ref 70–99)
INTERPRETATION ECG - MUSE: NORMAL
P AXIS - MUSE: NORMAL DEGREES
PR INTERVAL - MUSE: 348 MS
QRS DURATION - MUSE: 166 MS
QT - MUSE: 464 MS
QTC - MUSE: 486 MS
R AXIS - MUSE: -71 DEGREES
SYSTOLIC BLOOD PRESSURE - MUSE: NORMAL MMHG
T AXIS - MUSE: 105 DEGREES
VENTRICULAR RATE- MUSE: 66 BPM

## 2025-06-13 PROCEDURE — 250N000013 HC RX MED GY IP 250 OP 250 PS 637

## 2025-06-13 PROCEDURE — 250N000013 HC RX MED GY IP 250 OP 250 PS 637: Performed by: INTERNAL MEDICINE

## 2025-06-13 PROCEDURE — 99232 SBSQ HOSP IP/OBS MODERATE 35: CPT | Performed by: INTERNAL MEDICINE

## 2025-06-13 PROCEDURE — 120N000002 HC R&B MED SURG/OB UMMC

## 2025-06-13 PROCEDURE — 99232 SBSQ HOSP IP/OBS MODERATE 35: CPT

## 2025-06-13 RX ADMIN — QUETIAPINE FUMARATE 25 MG: 25 TABLET ORAL at 19:07

## 2025-06-13 RX ADMIN — QUETIAPINE FUMARATE 25 MG: 25 TABLET ORAL at 08:07

## 2025-06-13 RX ADMIN — FUROSEMIDE 20 MG: 20 TABLET ORAL at 08:06

## 2025-06-13 RX ADMIN — RISPERIDONE 0.5 MG: 0.5 TABLET, FILM COATED ORAL at 15:04

## 2025-06-13 RX ADMIN — OLOPATADINE HYDROCHLORIDE 1 DROP: 1 SOLUTION OPHTHALMIC at 08:07

## 2025-06-13 RX ADMIN — CLOMIPRAMINE HYDROCHLORIDE 25 MG: 25 CAPSULE ORAL at 21:53

## 2025-06-13 RX ADMIN — ROSUVASTATIN 20 MG: 20 TABLET, FILM COATED ORAL at 20:01

## 2025-06-13 RX ADMIN — PANTOPRAZOLE SODIUM 40 MG: 40 TABLET, DELAYED RELEASE ORAL at 16:38

## 2025-06-13 RX ADMIN — Medication 100 MG: at 08:06

## 2025-06-13 RX ADMIN — DICLOFENAC SODIUM TOPICAL GEL, 1% 4 G: 10 GEL TOPICAL at 16:38

## 2025-06-13 RX ADMIN — OLOPATADINE HYDROCHLORIDE 1 DROP: 1 SOLUTION OPHTHALMIC at 20:04

## 2025-06-13 RX ADMIN — PIMECROLIMUS: 10 CREAM TOPICAL at 08:09

## 2025-06-13 RX ADMIN — INSULIN ASPART 1 UNITS: 100 INJECTION, SOLUTION INTRAVENOUS; SUBCUTANEOUS at 21:54

## 2025-06-13 RX ADMIN — CLONAZEPAM 0.5 MG: 0.5 TABLET ORAL at 21:54

## 2025-06-13 RX ADMIN — Medication 25 MCG: at 08:06

## 2025-06-13 RX ADMIN — INSULIN GLARGINE 13 UNITS: 100 INJECTION, SOLUTION SUBCUTANEOUS at 20:04

## 2025-06-13 RX ADMIN — INSULIN ASPART 1 UNITS: 100 INJECTION, SOLUTION INTRAVENOUS; SUBCUTANEOUS at 08:07

## 2025-06-13 RX ADMIN — INSULIN ASPART 2 UNITS: 100 INJECTION, SOLUTION INTRAVENOUS; SUBCUTANEOUS at 12:34

## 2025-06-13 RX ADMIN — DICLOFENAC SODIUM TOPICAL GEL, 1% 4 G: 10 GEL TOPICAL at 20:04

## 2025-06-13 RX ADMIN — ACETAMINOPHEN 650 MG: 325 TABLET, FILM COATED ORAL at 20:03

## 2025-06-13 RX ADMIN — ATENOLOL 25 MG: 25 TABLET ORAL at 20:02

## 2025-06-13 RX ADMIN — APIXABAN 2.5 MG: 2.5 TABLET, FILM COATED ORAL at 20:01

## 2025-06-13 RX ADMIN — ATENOLOL 25 MG: 25 TABLET ORAL at 08:06

## 2025-06-13 RX ADMIN — Medication 1 CAPSULE: at 08:06

## 2025-06-13 RX ADMIN — DICLOFENAC SODIUM TOPICAL GEL, 1% 4 G: 10 GEL TOPICAL at 08:09

## 2025-06-13 RX ADMIN — Medication 1 MG: at 21:54

## 2025-06-13 RX ADMIN — LAMOTRIGINE 200 MG: 200 TABLET ORAL at 20:01

## 2025-06-13 RX ADMIN — QUETIAPINE FUMARATE 100 MG: 50 TABLET ORAL at 21:54

## 2025-06-13 RX ADMIN — PANTOPRAZOLE SODIUM 40 MG: 40 TABLET, DELAYED RELEASE ORAL at 08:05

## 2025-06-13 RX ADMIN — AZITHROMYCIN DIHYDRATE 250 MG: 250 TABLET ORAL at 08:05

## 2025-06-13 RX ADMIN — FUROSEMIDE 20 MG: 20 TABLET ORAL at 15:04

## 2025-06-13 RX ADMIN — MONTELUKAST 10 MG: 10 TABLET, FILM COATED ORAL at 08:06

## 2025-06-13 RX ADMIN — LAMOTRIGINE 200 MG: 200 TABLET ORAL at 08:06

## 2025-06-13 RX ADMIN — PIMECROLIMUS: 10 CREAM TOPICAL at 20:04

## 2025-06-13 RX ADMIN — APIXABAN 2.5 MG: 2.5 TABLET, FILM COATED ORAL at 08:05

## 2025-06-13 ASSESSMENT — ACTIVITIES OF DAILY LIVING (ADL)
ADLS_ACUITY_SCORE: 60
ADLS_ACUITY_SCORE: 56
ADLS_ACUITY_SCORE: 40
ADLS_ACUITY_SCORE: 60
ADLS_ACUITY_SCORE: 60
ADLS_ACUITY_SCORE: 56
ADLS_ACUITY_SCORE: 60
ADLS_ACUITY_SCORE: 56
ADLS_ACUITY_SCORE: 60
ADLS_ACUITY_SCORE: 40
ADLS_ACUITY_SCORE: 56
ADLS_ACUITY_SCORE: 60

## 2025-06-13 NOTE — PLAN OF CARE
"/76 (BP Location: Left arm)   Pulse 74   Temp 98.8  F (37.1  C) (Oral)   Resp 19   Ht 1.707 m (5' 7.2\")   Wt 90.1 kg (198 lb 9.6 oz)   SpO2 95%   BMI 30.92 kg/m    VSS on RA   Up IND   Voiding in bathroom     1:1 sitter at bedside for safety & Elopement protocol   Awaiting placement at inpatient psych   Goal Outcome Evaluation:      Plan of Care Reviewed With: patient    Overall Patient Progress: no changeOverall Patient Progress: no change    Outcome Evaluation: Pt pleasently confused, appropriatly answers questions but has increased elated behaviors (Laughing, dancing acting goofy). Up IND. DM2- protocol, insulin given. No attempts to leave unit- plan to transfer to inpatoent Psych- awaiting a bed. 1:1 sitter at bedside for safety and possible elopment/impulsivity.    @[facility](1)@        "
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
## 2025-06-13 NOTE — PLAN OF CARE
Goal Outcome Evaluation:    [id number]    Alert. Sitter at bedside for safety. Stable on RA. Took scheduled pills with applesauce. No PIV. BG monitored. VSS. Call light within reach. Handoff given to next RN.    @[facility]@

## 2025-06-13 NOTE — CONSULTS
IP MH Referral Acuity Rating Score (RARS)    LMHP complete at referral to IP MH, with DEC; and, daily while awaiting IP MH placement. Call score to PPS.  CRITERIA SCORING   New 72 HH and Involuntary for IP MH (not adolescent) 0/3   Boarding over 24 hours 1/1   Vulnerable adult at least 55+ with multiple co morbidities; or, Patient age 11 or under 1/1   Suicide ideation without relief of precipitating factors 0/1   Current plan for suicide 0/1   Current plan for homicide 0/1   Imminent risk or actual attempt to seriously harm another without relief of factors precipitating the attempt 0/1   Severe dysfunction in daily living (ex: complete neglect for self care, extreme disruption in vegetative function, extreme deterioration in social interactions) 1/1   Recent (last 2 weeks) or current physical aggression in the ED 0/1   Restraints or seclusion episode in ED 0/1   Verbal aggression, agitation, yelling, etc., while in the ED 0/1   Active psychosis with psychomotor agitation or catatonia 0/1   Need for constant or near constant redirection (from leaving, from others, etc).  1/1   Intrusive or disruptive behaviors 1/1   TOTAL 4

## 2025-06-13 NOTE — PROGRESS NOTES
[facility] - Internal Medicine Daily Note   Date of Service: 6/12/2025  Patient: Dominique Harkins  MRN: [id number]  Admission Date: 6/11/2025  Hospital Day # 2    Assessment & Plan  Dominique Harkins is a 79 year old female admitted on 6/11/2025. 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 She was brought to the ED via EMS for acute psychosis at home.      Acute psychosis, patient has an elated mood.  Not agitated.  Has bedside attendant in the room  Recent hospitalization for AMS and hypomania at [facility] (5/16-6/6/2025)  Bipolar disorder  Borderline personality disorder  Dependent personality disorder  OCD  Anxiety  PTSD  Insomnia   Presents to the ED after patient's  called 911 for acute psychotic behavior and unable to care for her at home. Patient was recently hospitalized at [facility] 5/16-6/6/2025 for AMS, hypomania and was discharged 4 days ago under the care of her . Patient's  says he found her in the seat of his car saying she needed to come to the hospital for tests and was exhibiting erratic and psychotic behavior. Labs largely unremarkable for infectious etiology. UA with leukocytes, 22 WBCs, negative nitrate. Previous head CT negative. At bedside, patient continues to exhibit tangential speech and disorganized thought processes.   - psychiatry consulted, appreciate assistance   - PTA regimen: clomipramine, lamotrigine, ativan, and seroquel   - give ceftiraxone 1g IM x1  - follow urine culture      COVID positive (5/31/2025) , SARS-CoV-2 isolation discontinued on 6/12.  Patient is not symptomatic  Chronic pulmonary infiltrates with prior recurrent pneumonia, suspected to be d/t chronic aspiration d/t  Zenker diverticulum (previously repaired 2017)  Suspected community-acquired pneumonia.  Patient currently does not have any productive cough.  Afebrile.  No leukocytosis.  Lungs clear to auscultation.  She was on IV Rocephin which was discontinued.  She likely had atypical bacterial pneumonia versus bronchitis.  EKG was obtained for a prolonged QT when she was started on azithromycin.  She received azithromycin 500 mg followed by 250 mg daily.     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 PTA regimen dulaglutide weekly, Toujeo 16 units nightly. While at [facility], was on glargine 16 units and lispro sliding scale.   - repeat A1c   - continue glargine 16 units   - start sliding scale insulin   - POCT glucose checks  - hypoglycemia protocol     GERD  - continue PTA PPI      Chronic pain  Follows with OP pain clinic. Previously failed oxycodone and dilaudid and recently started on Butrans per chart review however is not on current med list.   - continue to monitor      Hx of gastroparesis: gastric emptying study showing moderate to severe gastroparesis likely worsened by Trulicity, which was decreased by PCP on 4/25. Was not prescribed this while admitted to [facility], will hold here as well.            Diet: Regular Diet Adult  DVT Prophylaxis: DOAC  Lubin Catheter: Not present  Lines: None     Cardiac Monitoring: None  Code Status:        Micaela Murray MD  Internal Medicine Staff Hospitalist   [facility]   Pager: [phone]    Team: Selma Chandra 16  Page Cross Cover after 5 pm: pager [phone]   ___________________________________________________________________    Subjective & Interval Hx:      Patient seen and  examined  Hx of psychosis, elated mood   Psych re consulted   Hx of recent covid 19 infection and patient tested pos on 5/31  No respiratory symptoms at this  moment  CXR done reviewed, questionable pneumonia, right pleural effusion, trace  Discontinue IV ceftriaxone , start azithromycin mono therapy for possible atypical pneumonia   Patient is medically stable for discharge to inpatient psych      Last 24 hr care team notes reviewed.   ROS:  4 point ROS including Respiratory, CV, GI and , other than that noted in the HPI, is negative.    Medications: Reviewed in EPIC. List below for reference    Current Facility-Administered Medications:     acetaminophen (TYLENOL) tablet 650 mg, 650 mg, Oral, Q4H PRN, 650 mg at 06/12/25 1432 **OR** acetaminophen (TYLENOL) Suppository 650 mg, 650 mg, Rectal, Q4H PRN, Ashley Santos NP    albuterol (PROVENTIL) neb solution 2.5 mg, 2.5 mg, Nebulization, Q4H PRN, Ashley Santos NP, 2.5 mg at 06/12/25 0547    apixaban ANTICOAGULANT (ELIQUIS) tablet 2.5 mg, 2.5 mg, Oral, BID, Ashley Santos NP, 2.5 mg at 06/12/25 1954    atenolol (TENORMIN) tablet 25 mg, 25 mg, Oral, BID, Ashley Santos NP, 25 mg at 06/12/25 1954    azithromycin (ZITHROMAX) tablet 250 mg, 250 mg, Oral, Daily, Micaela Murray MD    calcium carbonate (TUMS) chewable tablet 1,000 mg, 1,000 mg, Oral, 4x Daily PRN, Ashley Santos NP    clomiPRAMINE (ANAFRANIL) capsule 25 mg, 25 mg, Oral, At Bedtime, Zora Fung APRN CNP, 25 mg at 06/12/25 2217    clonazePAM (klonoPIN) tablet 0.5 mg, 0.5 mg, Oral, At Bedtime, Zora Fung APRN CNP, 0.5 mg at 06/12/25 2217    glucose gel 15-30 g, 15-30 g, Oral, Q15 Min PRN **OR** dextrose 50 % injection 25-50 mL, 25-50 mL, Intravenous, Q15 Min PRN **OR** glucagon injection 1 mg, 1 mg, Subcutaneous, Q15 Min PRN, Ashley Santos NP    diclofenac (VOLTAREN) 1 % topical gel 4 g, 4 g, Topical, 4x Daily, Ashley Santos NP, 4 g at 06/12/25  1953    fluticasone (FLONASE) 50 MCG/ACT spray 1 spray, 1 spray, Both Nostrils, Daily PRN, Ashley Santos NP    furosemide (LASIX) tablet 20 mg, 20 mg, Oral, BID, Ashley Santos NP, 20 mg at 06/12/25 1616    insulin aspart (NovoLOG) injection (RAPID ACTING), 1-7 Units, Subcutaneous, TID AC, Ashley Santos NP, 1 Units at 06/12/25 1836    insulin aspart (NovoLOG) injection (RAPID ACTING), 1-5 Units, Subcutaneous, At Bedtime, Ashley Santos NP, 2 Units at 06/12/25 2216    insulin glargine (LANTUS PEN) injection 13 Units, 13 Units, Subcutaneous, QPM, David Leon MD, 13 Units at 06/12/25 1954    ketoconazole (NIZORAL) 2 % cream, , Topical, BID PRN, Ashley Santos NP    lamoTRIgine (LaMICtal) tablet 200 mg, 200 mg, Oral, BID, Rachael Rowland PA-C, 200 mg at 06/12/25 1955    lidocaine (LMX4) cream, , Topical, Q1H PRN, Ashley Santos NP    lidocaine 1 % 0.1-1 mL, 0.1-1 mL, Other, Q1H PRN, Ashley Satnos NP    melatonin tablet 1 mg, 1 mg, Oral, At Bedtime PRN, Ashley Santos NP, 1 mg at 06/12/25 2339    montelukast (SINGULAIR) tablet 10 mg, 10 mg, Oral, Daily, Ashley Santos NP, 10 mg at 06/12/25 0802    olopatadine (PATANOL) 0.1 % ophthalmic solution 1 drop, 1 drop, Both Eyes, BID, Ashley Santos NP, 1 drop at 06/12/25 1954    ondansetron (ZOFRAN ODT) ODT tab 4 mg, 4 mg, Oral, Q6H PRN **OR** ondansetron (ZOFRAN) injection 4 mg, 4 mg, Intravenous, Q6H PRN, Ashley Santos NP    pantoprazole (PROTONIX) EC tablet 40 mg, 40 mg, Oral, BID AC, David Leon MD, 40 mg at 06/12/25 1616    Patient is already receiving anticoagulation with heparin, enoxaparin (LOVENOX), warfarin (COUMADIN)  or other anticoagulant medication, , Does not apply, Continuous PRN, Ashley Santos NP    pimecrolimus (ELIDEL) 1 % cream, , Topical, BID, Ashley Santos NP, Given at 06/12/25 1954    polyethylene glycol (MIRALAX) Packet 17 g, 17 g, Oral, Daily, Ashley Santos,  "NP, 17 g at 06/12/25 0803    psyllium (METAMUCIL/KONSYL) capsule 1 capsule, 1 capsule, Oral, Daily, Ashley Santos NP, 1 capsule at 06/12/25 0802    pyridOXINE (VITAMIN B6) tablet 100 mg, 100 mg, Oral, Daily, Ashley Santos NP    QUEtiapine (SEROquel) tablet 100 mg, 100 mg, Oral, At Bedtime, Zora Fung, RACQUEL CNP, 100 mg at 06/12/25 2217    QUEtiapine (SEROquel) tablet 25 mg, 25 mg, Oral, BID, Rachael Rowland PA-C, 25 mg at 06/12/25 1812    risperiDONE (risperDAL) tablet 0.5 mg, 0.5 mg, Oral, BID PRN, Rachael Rowland PA-C, 0.5 mg at 06/12/25 0610    risperiDONE (risperDAL) tablet 1 mg, 1 mg, Oral, At Bedtime, Rachael Rowland PA-C, 1 mg at 06/12/25 2217    rosuvastatin (CRESTOR) tablet 20 mg, 20 mg, Oral, QPM, Ashley Santos NP, 20 mg at 06/12/25 1954    senna-docusate (SENOKOT-S/PERICOLACE) 8.6-50 MG per tablet 1 tablet, 1 tablet, Oral, BID PRN **OR** senna-docusate (SENOKOT-S/PERICOLACE) 8.6-50 MG per tablet 2 tablet, 2 tablet, Oral, BID PRN, Ashley Santos NP    sodium chloride (PF) 0.9% PF flush 3 mL, 3 mL, Intracatheter, Q8H Jerald GUO Ford Christian, MD    sodium chloride (PF) 0.9% PF flush 3 mL, 3 mL, Intracatheter, q1 min prn, El Marques MD    sodium chloride (PF) 0.9% PF flush 3 mL, 3 mL, Intracatheter, Q8H Tom GUO Hannah, NP    sodium chloride (PF) 0.9% PF flush 3 mL, 3 mL, Intracatheter, q1 min prn, Ashley Santos NP    Vitamin D3 (CHOLECALCIFEROL) tablet 25 mcg, 25 mcg, Oral, Daily, Ashley Santos NP, 25 mcg at 06/12/25 0802    Physical Exam:    /51   Pulse 63   Temp 98.6  F (37  C) (Oral)   Resp 16   Ht 1.707 m (5' 7.2\")   Wt 90.1 kg (198 lb 9.6 oz)   SpO2 93%   BMI 30.92 kg/m       GENERAL: Alert and oriented x 3. NAD.   HEENT: Anicteric sclera. Mucous membranes moist.   CV: RRR. S1, S2. No murmurs appreciated.   RESPIRATORY: Effort normal on RA. Lungs CTAB with no wheezing, rales, rhonchi.   GI: Abdomen soft and non distended " with normoactive bowel sounds present in all quadrants. No tenderness, rebound, guarding.   NEUROLOGICAL: No focal deficits. Moves all extremities.    EXTREMITIES: No peripheral edema. Intact bilateral pedal pulses.   SKIN: No jaundice. No rashes.     Labs & Studies of Note: I personally reviewed the following studies:  CBC RESULTS:   Recent Labs   Lab Test 06/12/25  0627   WBC 6.4   RBC 4.00   HGB 11.9   HCT 36.4   MCV 91   MCH 29.8   MCHC 32.7   RDW 12.8        Last Comprehensive Metabolic Panel:  Lab Results   Component Value Date     06/12/2025    POTASSIUM 3.9 06/12/2025    CHLORIDE 100 06/12/2025    CO2 23 06/12/2025    ANIONGAP 15 06/12/2025     (H) 06/13/2025    BUN 16.8 06/12/2025    CR 0.90 06/12/2025    GFRESTIMATED 65 06/12/2025    KHUSHI 9.1 06/12/2025       XR Chest Port 1 View   Final Result   IMPRESSION: Possible infection versus edema/trace right pleural   effusion at right lung base. Pacemaker. Bilateral reverse total   shoulder arthroplasty.      REBEL MUIR MD            SYSTEM ID:  [id number]

## 2025-06-14 ENCOUNTER — TELEPHONE (OUTPATIENT)
Dept: BEHAVIORAL HEALTH | Facility: CLINIC | Age: 80
End: 2025-06-14
Payer: COMMERCIAL

## 2025-06-14 LAB
ALBUMIN UR-MCNC: NEGATIVE MG/DL
APPEARANCE UR: CLEAR
BILIRUB UR QL STRIP: NEGATIVE
COLOR UR AUTO: NORMAL
GLUCOSE BLDC GLUCOMTR-MCNC: 173 MG/DL (ref 70–99)
GLUCOSE BLDC GLUCOMTR-MCNC: 188 MG/DL (ref 70–99)
GLUCOSE BLDC GLUCOMTR-MCNC: 194 MG/DL (ref 70–99)
GLUCOSE BLDC GLUCOMTR-MCNC: 95 MG/DL (ref 70–99)
GLUCOSE UR STRIP-MCNC: NEGATIVE MG/DL
HGB UR QL STRIP: NEGATIVE
KETONES UR STRIP-MCNC: NEGATIVE MG/DL
LEUKOCYTE ESTERASE UR QL STRIP: NEGATIVE
NITRATE UR QL: NEGATIVE
PH UR STRIP: 5 [PH] (ref 5–7)
SP GR UR STRIP: 1.02 (ref 1–1.03)
UROBILINOGEN UR STRIP-MCNC: NORMAL MG/DL

## 2025-06-14 PROCEDURE — 250N000013 HC RX MED GY IP 250 OP 250 PS 637

## 2025-06-14 PROCEDURE — 81003 URINALYSIS AUTO W/O SCOPE: CPT | Performed by: INTERNAL MEDICINE

## 2025-06-14 PROCEDURE — 250N000013 HC RX MED GY IP 250 OP 250 PS 637: Performed by: INTERNAL MEDICINE

## 2025-06-14 PROCEDURE — 99232 SBSQ HOSP IP/OBS MODERATE 35: CPT | Performed by: INTERNAL MEDICINE

## 2025-06-14 PROCEDURE — 93010 ELECTROCARDIOGRAM REPORT: CPT | Performed by: INTERNAL MEDICINE

## 2025-06-14 PROCEDURE — 120N000002 HC R&B MED SURG/OB UMMC

## 2025-06-14 PROCEDURE — 250N000012 HC RX MED GY IP 250 OP 636 PS 637: Performed by: INTERNAL MEDICINE

## 2025-06-14 RX ADMIN — APIXABAN 2.5 MG: 2.5 TABLET, FILM COATED ORAL at 08:17

## 2025-06-14 RX ADMIN — DICLOFENAC SODIUM TOPICAL GEL, 1% 4 G: 10 GEL TOPICAL at 11:49

## 2025-06-14 RX ADMIN — INSULIN GLARGINE 13 UNITS: 100 INJECTION, SOLUTION SUBCUTANEOUS at 22:21

## 2025-06-14 RX ADMIN — ATENOLOL 25 MG: 25 TABLET ORAL at 08:16

## 2025-06-14 RX ADMIN — Medication 25 MCG: at 08:16

## 2025-06-14 RX ADMIN — ACETAMINOPHEN 650 MG: 325 TABLET, FILM COATED ORAL at 11:49

## 2025-06-14 RX ADMIN — PANTOPRAZOLE SODIUM 40 MG: 40 TABLET, DELAYED RELEASE ORAL at 17:04

## 2025-06-14 RX ADMIN — ACETAMINOPHEN 650 MG: 325 TABLET, FILM COATED ORAL at 17:04

## 2025-06-14 RX ADMIN — CLONAZEPAM 0.5 MG: 0.5 TABLET ORAL at 22:18

## 2025-06-14 RX ADMIN — QUETIAPINE FUMARATE 25 MG: 25 TABLET ORAL at 08:17

## 2025-06-14 RX ADMIN — CLOMIPRAMINE HYDROCHLORIDE 25 MG: 25 CAPSULE ORAL at 22:17

## 2025-06-14 RX ADMIN — Medication 1 CAPSULE: at 08:17

## 2025-06-14 RX ADMIN — LAMOTRIGINE 200 MG: 200 TABLET ORAL at 08:16

## 2025-06-14 RX ADMIN — ACETAMINOPHEN 650 MG: 325 TABLET, FILM COATED ORAL at 22:17

## 2025-06-14 RX ADMIN — DICLOFENAC SODIUM TOPICAL GEL, 1% 4 G: 10 GEL TOPICAL at 22:29

## 2025-06-14 RX ADMIN — QUETIAPINE FUMARATE 100 MG: 50 TABLET ORAL at 22:17

## 2025-06-14 RX ADMIN — INSULIN ASPART 2 UNITS: 100 INJECTION, SOLUTION INTRAVENOUS; SUBCUTANEOUS at 08:18

## 2025-06-14 RX ADMIN — FUROSEMIDE 20 MG: 20 TABLET ORAL at 08:17

## 2025-06-14 RX ADMIN — FUROSEMIDE 20 MG: 20 TABLET ORAL at 17:04

## 2025-06-14 RX ADMIN — DICLOFENAC SODIUM TOPICAL GEL, 1% 4 G: 10 GEL TOPICAL at 08:17

## 2025-06-14 RX ADMIN — PIMECROLIMUS: 10 CREAM TOPICAL at 08:18

## 2025-06-14 RX ADMIN — Medication 100 MG: at 08:16

## 2025-06-14 RX ADMIN — INSULIN ASPART 1 UNITS: 100 INJECTION, SOLUTION INTRAVENOUS; SUBCUTANEOUS at 11:53

## 2025-06-14 RX ADMIN — QUETIAPINE FUMARATE 25 MG: 25 TABLET ORAL at 17:04

## 2025-06-14 RX ADMIN — ATENOLOL 25 MG: 25 TABLET ORAL at 20:19

## 2025-06-14 RX ADMIN — AZITHROMYCIN DIHYDRATE 250 MG: 250 TABLET ORAL at 08:16

## 2025-06-14 RX ADMIN — OLOPATADINE HYDROCHLORIDE 1 DROP: 1 SOLUTION OPHTHALMIC at 08:18

## 2025-06-14 RX ADMIN — PANTOPRAZOLE SODIUM 40 MG: 40 TABLET, DELAYED RELEASE ORAL at 08:17

## 2025-06-14 RX ADMIN — APIXABAN 2.5 MG: 2.5 TABLET, FILM COATED ORAL at 20:19

## 2025-06-14 RX ADMIN — ROSUVASTATIN 20 MG: 20 TABLET, FILM COATED ORAL at 20:19

## 2025-06-14 RX ADMIN — PIMECROLIMUS: 10 CREAM TOPICAL at 22:17

## 2025-06-14 RX ADMIN — RISPERIDONE 0.5 MG: 0.5 TABLET, FILM COATED ORAL at 11:49

## 2025-06-14 RX ADMIN — LAMOTRIGINE 200 MG: 200 TABLET ORAL at 20:19

## 2025-06-14 RX ADMIN — OLOPATADINE HYDROCHLORIDE 1 DROP: 1 SOLUTION OPHTHALMIC at 22:18

## 2025-06-14 RX ADMIN — MONTELUKAST 10 MG: 10 TABLET, FILM COATED ORAL at 08:16

## 2025-06-14 ASSESSMENT — ACTIVITIES OF DAILY LIVING (ADL)
ADLS_ACUITY_SCORE: 40

## 2025-06-14 NOTE — PLAN OF CARE
"BP (!) 112/95 (BP Location: Left arm)   Pulse 67   Temp 98  F (36.7  C) (Oral)   Resp 19   Ht 1.707 m (5' 7.2\")   Wt 128.3 kg (282 lb 13.6 oz)   SpO2 95%   BMI 44.04 kg/m    /51   Pulse 66   Temp 99.2  F (37.3  C) (Oral)   Resp 18   Ht 1.707 m (5' 7.2\")   Wt 128.3 kg (282 lb 13.6 oz)   SpO2 98%   BMI 44.04 kg/m    VSS on RA   Up SBA w/ walker    Goal Outcome Evaluation:      Plan of Care Reviewed With: patient    Overall Patient Progress: improvingOverall Patient Progress: improving    Outcome Evaluation: Pt A&O but pleasently confused, has elated behaviors (Singing, dancing, laughing) VSS on RA. Up SBA w/ walker. Voiding in bathroom- UA sent for analysis. 1:1 sitter at bedside for elopment precautions and safety. Awaiting placement into Psych unit. No IV Access.    @[facility](1)@        "

## 2025-06-14 NOTE — TELEPHONE ENCOUNTER
R: [place]  Access Inpatient Bed Call Log 6/14/2025 @ 3:40 PM:  Intake has called facilities that have not updated their bed status within the last 12 hours.    [facility] is posting 0 beds.                  [facility] is posting 0 beds. [phone]. Per Miley @ 3:48 PM, they are at capacity for the weekend   [facility] is posting 0 beds. Negative covid required. Called @ 3:50 PM, no answer. Called again @ 6:18 PM and spoke to Ingrid who reports they are at capacity until 8AM  [facility] is posting 0 beds. Neg covid. No high school/[facility]-psych. [phone]. Per Alissa @ 3:49 PM, they are full until Monday  [facility] is posting 0 beds. [phone]. Called @ 3:50 PM, no answer. Called again @ 6:18 PM and spoke to Ingrid who reports they are at capacity until 8AM  [facility] is posting 0 beds. [phone]. Per More @ 3:53 PM, they are full   [facility] is posting 6 beds. (Ages 18-35) Negative covid, no aggression, physical or sexual assault, violence hx or drug abuse, or psychosis. [phone]. Pt is not within age range requirement  [facility] is posting 0 beds. Called @ 3:50 PM, no answer. Called again @ 6:18 PM and spoke to [employer] who reports they are at capacity until 8AM  [facility] is posting 0 beds. [phone]. Called @ 3:50 PM, no answer.  Called again @ 6:18 PM and spoke to [facility] who reports they are at capacity until 8AM    [facility] is posting 0 beds. [phone]. Per call @ 3:55 PM, staff reports they are full tonight    Pt remains on the work list pending appropriate bed availability.

## 2025-06-14 NOTE — PROGRESS NOTES
SHIFT [id number]    Pt A/O x4 and foregtful. Pt transfer SBA. No LDA's. Pt is continent LBM 6/13/2025. Diet is reg, thin, pills whole or with applesauce. No skin concerns. No pain. Pt showered this shift. Pt is pleasant, though on 72 hr hold per report. Call light within reach, no concerns at this time. Pt on 1:1 for impulsivity.

## 2025-06-14 NOTE — TELEPHONE ENCOUNTER
R: kateryna Arauz at 10:02 am.    Called medical unit at 10:02 am and asked for name of MD who is covering today. Per Laura, doctor today is Dr Murray ([facility]).     Pagenelson Arauz at 11:02 am.    Bed on 3b west is no longer available as there was a pt on station 12 who needed to move off unit ASAP to transfer to  due to safety issues.     [facility] Access Inpatient Bed Call Log 6/14/25 at 7:04 am:  (metro):   Intake has called facilities that have not updated the bed status within the last 12 hours.                                     [facility] is posting 0 beds.            [facility] is posting 0 beds. [phone]; per call at 7:05 am to [facility], they are at cap.    [facility] is posting 0 beds. Negative covid required.[phone].    [facility] is posting 0 beds. Neg covid. No high school/[facility]-psych. [phone] per call at 7:06 am to Tanika, they have 1 step-down/low acuity bed avail.  Per call at 11:04 am to Carlee, they are at cap.   [facility] is posting 0 beds. [phone]    [facility] is posting 0 beds. [phone]     [facility] is posting 6 beds. Ages 18-35. Negative covid. [phone]; per call at 7:07 am, left a vm asking for a call back with bed avail info. Pt is outside the accepted age range.   [facility] is posting 0 beds. [phone].    [facility] is posting 0 beds [phone].      Pt remains on the work list pending appropriate bed availability.

## 2025-06-14 NOTE — PLAN OF CARE
Goal Outcome Evaluation: 1900-0700      Plan of Care Reviewed With: patient    Overall Patient Progress: no changeOverall Patient Progress: no change    Outcome Evaluation: Pt  A&Ox4, but pleasantly confused, on RA, required pain managment with PRN tylenol and hotpack. has elated behaviors, BG monitored with sliding scale insulin, up with SBA, cont. B/B, scattered brusing on lower extremities, no IV access, 1:1 sitter at bedside for safety, plan of care continues.

## 2025-06-14 NOTE — PROGRESS NOTES
[facility] - Internal Medicine Daily Note   Date of Service: 6/12/2025  Patient: Dominique Harkins  MRN: [id number]  Admission Date: 6/11/2025  Hospital Day # 3    Assessment & Plan  Dominique Harkins is a 79 year old female admitted on 6/11/2025. 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 She was brought to the ED via EMS for acute psychosis at home.      Acute psychosis, patient has an elated mood.  Not agitated.  Has bedside attendant in the room  Recent hospitalization for AMS and hypomania at [facility] (5/16-6/6/2025)  Bipolar disorder  Borderline personality disorder  Dependent personality disorder  OCD  Anxiety  PTSD  Insomnia   Presents to the ED after patient's  called 911 for acute psychotic behavior and unable to care for her at home. Patient was recently hospitalized at [facility] 5/16-6/6/2025 for AMS, hypomania and was discharged 4 days ago under the care of her . Patient's  says he found her in the seat of his car saying she needed to come to the hospital for tests and was exhibiting erratic and psychotic behavior. Labs largely unremarkable for infectious etiology. UA with leukocytes, 22 WBCs, negative nitrate. Previous head CT negative. At bedside, patient continues to exhibit tangential speech and disorganized thought processes.   - psychiatry consulted, appreciate assistance   - PTA regimen: clomipramine, lamotrigine, ativan, and seroquel   - give ceftiraxone 1g IM x1  - follow urine culture      COVID positive (5/31/2025) , SARS-CoV-2 isolation discontinued on 6/12.  Patient is not symptomatic  Chronic pulmonary infiltrates with prior recurrent pneumonia, suspected to be d/t chronic aspiration d/t  Zenker diverticulum (previously repaired 2017)  Suspected community-acquired pneumonia.  Patient currently does not have any productive cough.  Afebrile.  No leukocytosis.  Lungs clear to auscultation.  She was on IV Rocephin which was discontinued.  She likely had atypical bacterial pneumonia versus bronchitis.  EKG was obtained for a prolonged QT when she was started on azithromycin.  She received azithromycin 500 mg followed by 250 mg daily.     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 PTA regimen dulaglutide weekly, Toujeo 16 units nightly. While at [facility], was on glargine 16 units and lispro sliding scale.   - repeat A1c   - continue glargine 16 units   - start sliding scale insulin   - POCT glucose checks  - hypoglycemia protocol     GERD  - continue PTA PPI      Chronic pain  Follows with [facility] pain [facility]. Previously failed oxycodone and dilaudid and recently started on Butrans per chart review however is not on current med list.   - continue to monitor      Hx of gastroparesis: gastric emptying study showing moderate to severe gastroparesis likely worsened by Trulicity, which was decreased by PCP on 4/25. Was not prescribed this while admitted to [facility], will hold here as well.            Diet: Regular Diet Adult  DVT Prophylaxis: DOAC  Lubin Catheter: Not present  Lines: None     Cardiac Monitoring: None  Code Status:        Micaela Murray MD  Internal Medicine Staff Hospitalist   [facility]   Pager: [phone]    Team: Selma Chandra 16  Page Cross Cover after 5 pm: pager [phone]   ___________________________________________________________________    Subjective & Interval Hx:    Patient with bipolar disorder  , OCD and panic disorder ,PTSD  Patient seen and examined  Hx of psychosis, elated mood   Psych re consulted   Hx of recent covid 19 infection and patient tested pos on 5/31  No respiratory symptoms at this  moment  CXR done reviewed, questionable pneumonia, right pleural effusion, trace  Discontinue IV ceftriaxone , start azithromycin mono therapy for possible atypical pneumonia   Patient is medically stable for discharge to inpatient psych      Last 24 hr care team notes reviewed.   ROS:  4 point ROS including Respiratory, CV, GI and , other than that noted in the HPI, is negative.    Medications: Reviewed in EPIC. List below for reference    Current Facility-Administered Medications:     acetaminophen (TYLENOL) tablet 650 mg, 650 mg, Oral, Q4H PRN, 650 mg at 06/13/25 2003 **OR** acetaminophen (TYLENOL) Suppository 650 mg, 650 mg, Rectal, Q4H PRN, Ashley Santos NP    albuterol (PROVENTIL) neb solution 2.5 mg, 2.5 mg, Nebulization, Q4H PRN, Ashley Santos NP, 2.5 mg at 06/12/25 0547    apixaban ANTICOAGULANT (ELIQUIS) tablet 2.5 mg, 2.5 mg, Oral, BID, Ashley Snatos NP, 2.5 mg at 06/14/25 0817    atenolol (TENORMIN) tablet 25 mg, 25 mg, Oral, BID, Ashley Santos NP, 25 mg at 06/14/25 0816    azithromycin (ZITHROMAX) tablet 250 mg, 250 mg, Oral, Daily, Micaela Murray MD, 250 mg at 06/14/25 0816    calcium carbonate (TUMS) chewable tablet 1,000 mg, 1,000 mg, Oral, 4x Daily PRN, Ashley Santos NP    clomiPRAMINE (ANAFRANIL) capsule 25 mg, 25 mg, Oral, At Bedtime, Zora Fung APRN CNP, 25 mg at 06/13/25 2153    clonazePAM (klonoPIN) tablet 0.5 mg, 0.5 mg, Oral, At Bedtime, Zora Fung APRN CNP, 0.5 mg at 06/13/25 2154    glucose gel 15-30 g, 15-30 g, Oral, Q15 Min PRN **OR** dextrose 50 % injection 25-50 mL, 25-50 mL, Intravenous, Q15 Min PRN **OR** glucagon injection 1 mg, 1 mg, Subcutaneous, Q15 Min PRN, Ashley Santos, NP    diclofenac (VOLTAREN) 1 % topical gel  4 g, 4 g, Topical, 4x Daily, Ashley Santos NP, 4 g at 06/14/25 0817    fluticasone (FLONASE) 50 MCG/ACT spray 1 spray, 1 spray, Both Nostrils, Daily PRN, Ashley Santos NP    furosemide (LASIX) tablet 20 mg, 20 mg, Oral, BID, Ashley Santos NP, 20 mg at 06/14/25 0817    insulin aspart (NovoLOG) injection (RAPID ACTING), 1-7 Units, Subcutaneous, TID AC, Ashley Santos NP, 2 Units at 06/14/25 0818    insulin aspart (NovoLOG) injection (RAPID ACTING), 1-5 Units, Subcutaneous, At Bedtime, Ashley Santos NP, 1 Units at 06/13/25 2154    insulin glargine (LANTUS PEN) injection 13 Units, 13 Units, Subcutaneous, QPM, David Leon MD, 13 Units at 06/13/25 2004    ketoconazole (NIZORAL) 2 % cream, , Topical, BID PRN, Ashley Santos NP    lamoTRIgine (LaMICtal) tablet 200 mg, 200 mg, Oral, BID, Rachael Rowland PA-C, 200 mg at 06/14/25 0816    lidocaine (LMX4) cream, , Topical, Q1H PRN, Ashley Santos NP    lidocaine 1 % 0.1-1 mL, 0.1-1 mL, Other, Q1H PRN, Ashley Santos NP    melatonin tablet 1 mg, 1 mg, Oral, At Bedtime PRN, Ashley Santos NP, 1 mg at 06/13/25 2154    montelukast (SINGULAIR) tablet 10 mg, 10 mg, Oral, Daily, Ashley Santos NP, 10 mg at 06/14/25 0816    olopatadine (PATANOL) 0.1 % ophthalmic solution 1 drop, 1 drop, Both Eyes, BID, Ashley Santos NP, 1 drop at 06/14/25 0818    ondansetron (ZOFRAN ODT) ODT tab 4 mg, 4 mg, Oral, Q6H PRN **OR** ondansetron (ZOFRAN) injection 4 mg, 4 mg, Intravenous, Q6H PRN, Ashley Santos NP    pantoprazole (PROTONIX) EC tablet 40 mg, 40 mg, Oral, BID AC, David Leon MD, 40 mg at 06/14/25 0817    Patient is already receiving anticoagulation with heparin, enoxaparin (LOVENOX), warfarin (COUMADIN)  or other anticoagulant medication, , Does not apply, Continuous PRN, Ashley Santos NP    pimecrolimus (ELIDEL) 1 % cream, , Topical, BID, Ashley Santos NP, Given at 06/14/25 0818    polyethylene  "glycol (MIRALAX) Packet 17 g, 17 g, Oral, Daily, Ashley Santos NP, 17 g at 06/12/25 0803    psyllium (METAMUCIL/KONSYL) capsule 1 capsule, 1 capsule, Oral, Daily, Ashley Santos NP, 1 capsule at 06/14/25 0817    pyridOXINE (VITAMIN B6) tablet 100 mg, 100 mg, Oral, Daily, Ashley Santos NP, 100 mg at 06/14/25 0816    QUEtiapine (SEROquel) tablet 100 mg, 100 mg, Oral, At Bedtime, Zora Fung, APRN CNP, 100 mg at 06/13/25 2154    QUEtiapine (SEROquel) tablet 25 mg, 25 mg, Oral, BID, Rachael Rowland PA-C, 25 mg at 06/14/25 0817    risperiDONE (risperDAL) tablet 0.5 mg, 0.5 mg, Oral, BID PRN, Rachael Rowland PA-C, 0.5 mg at 06/13/25 1504    rosuvastatin (CRESTOR) tablet 20 mg, 20 mg, Oral, QPM, Ashley Santos NP, 20 mg at 06/13/25 2001    senna-docusate (SENOKOT-S/PERICOLACE) 8.6-50 MG per tablet 1 tablet, 1 tablet, Oral, BID PRN **OR** senna-docusate (SENOKOT-S/PERICOLACE) 8.6-50 MG per tablet 2 tablet, 2 tablet, Oral, BID PRN, Ashley Santos NP    sodium chloride (PF) 0.9% PF flush 3 mL, 3 mL, Intracatheter, Q8H BRANTJerald Ford Christian, MD    sodium chloride (PF) 0.9% PF flush 3 mL, 3 mL, Intracatheter, q1 min prn, El Marques MD    sodium chloride (PF) 0.9% PF flush 3 mL, 3 mL, Intracatheter, Q8H Tom GUO Hannah, NP    sodium chloride (PF) 0.9% PF flush 3 mL, 3 mL, Intracatheter, q1 min prn, Ashley Santos NP    Vitamin D3 (CHOLECALCIFEROL) tablet 25 mcg, 25 mcg, Oral, Daily, Ashley Santos NP, 25 mcg at 06/14/25 0816    Physical Exam:    /49 (BP Location: Left arm)   Pulse 67   Temp 98  F (36.7  C) (Oral)   Resp 19   Ht 1.707 m (5' 7.2\")   Wt 128.3 kg (282 lb 13.6 oz)   SpO2 97%   BMI 44.04 kg/m       GENERAL: Alert and oriented x 3. NAD.   HEENT: Anicteric sclera. Mucous membranes moist.   CV: RRR. S1, S2. No murmurs appreciated.   RESPIRATORY: Effort normal on RA. Lungs CTAB with no wheezing, rales, rhonchi.   GI: Abdomen soft " and non distended with normoactive bowel sounds present in all quadrants. No tenderness, rebound, guarding.   NEUROLOGICAL: No focal deficits. Moves all extremities.    EXTREMITIES: No peripheral edema. Intact bilateral pedal pulses.   SKIN: No jaundice. No rashes.     Labs & Studies of Note: I personally reviewed the following studies:  CBC RESULTS:   Recent Labs   Lab Test 06/12/25  0627   WBC 6.4   RBC 4.00   HGB 11.9   HCT 36.4   MCV 91   MCH 29.8   MCHC 32.7   RDW 12.8        Last Comprehensive Metabolic Panel:  Lab Results   Component Value Date     06/12/2025    POTASSIUM 3.9 06/12/2025    CHLORIDE 100 06/12/2025    CO2 23 06/12/2025    ANIONGAP 15 06/12/2025     (H) 06/14/2025    BUN 16.8 06/12/2025    CR 0.90 06/12/2025    GFRESTIMATED 65 06/12/2025    KHUSHI 9.1 06/12/2025       XR Chest Port 1 View   Final Result   IMPRESSION: Possible infection versus edema/trace right pleural   effusion at right lung base. Pacemaker. Bilateral reverse total   shoulder arthroplasty.      REBEL MUIR MD            SYSTEM ID:  [id number]

## 2025-06-15 ENCOUNTER — TELEPHONE (OUTPATIENT)
Dept: BEHAVIORAL HEALTH | Facility: CLINIC | Age: 80
End: 2025-06-15
Payer: COMMERCIAL

## 2025-06-15 LAB
GLUCOSE BLDC GLUCOMTR-MCNC: 144 MG/DL (ref 70–99)
GLUCOSE BLDC GLUCOMTR-MCNC: 167 MG/DL (ref 70–99)
GLUCOSE BLDC GLUCOMTR-MCNC: 184 MG/DL (ref 70–99)
GLUCOSE BLDC GLUCOMTR-MCNC: 197 MG/DL (ref 70–99)
GLUCOSE BLDC GLUCOMTR-MCNC: 230 MG/DL (ref 70–99)

## 2025-06-15 PROCEDURE — 94660 CPAP INITIATION&MGMT: CPT

## 2025-06-15 PROCEDURE — 250N000013 HC RX MED GY IP 250 OP 250 PS 637: Performed by: INTERNAL MEDICINE

## 2025-06-15 PROCEDURE — 250N000013 HC RX MED GY IP 250 OP 250 PS 637

## 2025-06-15 PROCEDURE — 272N000064 HC CIRCUIT HUMIDITY W/CPAP BIPAP

## 2025-06-15 PROCEDURE — 120N000002 HC R&B MED SURG/OB UMMC

## 2025-06-15 PROCEDURE — 99232 SBSQ HOSP IP/OBS MODERATE 35: CPT | Performed by: INTERNAL MEDICINE

## 2025-06-15 PROCEDURE — A7035 POS AIRWAY PRESS HEADGEAR: HCPCS

## 2025-06-15 PROCEDURE — 999N000157 HC STATISTIC RCP TIME EA 10 MIN

## 2025-06-15 PROCEDURE — 250N000012 HC RX MED GY IP 250 OP 636 PS 637

## 2025-06-15 RX ADMIN — PANTOPRAZOLE SODIUM 40 MG: 40 TABLET, DELAYED RELEASE ORAL at 09:22

## 2025-06-15 RX ADMIN — POLYETHYLENE GLYCOL 3350 17 G: 17 POWDER, FOR SOLUTION ORAL at 09:17

## 2025-06-15 RX ADMIN — ATENOLOL 25 MG: 25 TABLET ORAL at 20:16

## 2025-06-15 RX ADMIN — PIMECROLIMUS: 10 CREAM TOPICAL at 09:46

## 2025-06-15 RX ADMIN — CLOMIPRAMINE HYDROCHLORIDE 25 MG: 25 CAPSULE ORAL at 21:16

## 2025-06-15 RX ADMIN — INSULIN ASPART 1 UNITS: 100 INJECTION, SOLUTION INTRAVENOUS; SUBCUTANEOUS at 09:38

## 2025-06-15 RX ADMIN — OLOPATADINE HYDROCHLORIDE 1 DROP: 1 SOLUTION OPHTHALMIC at 20:19

## 2025-06-15 RX ADMIN — DICLOFENAC SODIUM TOPICAL GEL, 1% 4 G: 10 GEL TOPICAL at 13:33

## 2025-06-15 RX ADMIN — APIXABAN 2.5 MG: 2.5 TABLET, FILM COATED ORAL at 20:17

## 2025-06-15 RX ADMIN — OLOPATADINE HYDROCHLORIDE 1 DROP: 1 SOLUTION OPHTHALMIC at 09:38

## 2025-06-15 RX ADMIN — FUROSEMIDE 20 MG: 20 TABLET ORAL at 17:46

## 2025-06-15 RX ADMIN — FUROSEMIDE 20 MG: 20 TABLET ORAL at 09:22

## 2025-06-15 RX ADMIN — PIMECROLIMUS: 10 CREAM TOPICAL at 20:19

## 2025-06-15 RX ADMIN — LAMOTRIGINE 200 MG: 200 TABLET ORAL at 20:16

## 2025-06-15 RX ADMIN — MONTELUKAST 10 MG: 10 TABLET, FILM COATED ORAL at 09:23

## 2025-06-15 RX ADMIN — RISPERIDONE 0.5 MG: 0.5 TABLET, FILM COATED ORAL at 04:06

## 2025-06-15 RX ADMIN — INSULIN ASPART 2 UNITS: 100 INJECTION, SOLUTION INTRAVENOUS; SUBCUTANEOUS at 13:32

## 2025-06-15 RX ADMIN — Medication 1 CAPSULE: at 09:22

## 2025-06-15 RX ADMIN — PANTOPRAZOLE SODIUM 40 MG: 40 TABLET, DELAYED RELEASE ORAL at 17:47

## 2025-06-15 RX ADMIN — ACETAMINOPHEN 650 MG: 325 TABLET, FILM COATED ORAL at 14:31

## 2025-06-15 RX ADMIN — INSULIN GLARGINE 13 UNITS: 100 INJECTION, SOLUTION SUBCUTANEOUS at 20:20

## 2025-06-15 RX ADMIN — DICLOFENAC SODIUM TOPICAL GEL, 1% 4 G: 10 GEL TOPICAL at 20:19

## 2025-06-15 RX ADMIN — INSULIN ASPART 1 UNITS: 100 INJECTION, SOLUTION INTRAVENOUS; SUBCUTANEOUS at 18:47

## 2025-06-15 RX ADMIN — CLONAZEPAM 0.5 MG: 0.5 TABLET ORAL at 21:16

## 2025-06-15 RX ADMIN — Medication 100 MG: at 09:21

## 2025-06-15 RX ADMIN — QUETIAPINE FUMARATE 25 MG: 25 TABLET ORAL at 17:47

## 2025-06-15 RX ADMIN — APIXABAN 2.5 MG: 2.5 TABLET, FILM COATED ORAL at 09:33

## 2025-06-15 RX ADMIN — Medication 25 MCG: at 09:22

## 2025-06-15 RX ADMIN — QUETIAPINE FUMARATE 100 MG: 50 TABLET ORAL at 21:16

## 2025-06-15 RX ADMIN — AZITHROMYCIN DIHYDRATE 250 MG: 250 TABLET ORAL at 09:23

## 2025-06-15 RX ADMIN — RISPERIDONE 0.5 MG: 0.5 TABLET, FILM COATED ORAL at 14:49

## 2025-06-15 RX ADMIN — LAMOTRIGINE 200 MG: 200 TABLET ORAL at 09:22

## 2025-06-15 RX ADMIN — QUETIAPINE FUMARATE 25 MG: 25 TABLET ORAL at 09:43

## 2025-06-15 RX ADMIN — ROSUVASTATIN 20 MG: 20 TABLET, FILM COATED ORAL at 20:16

## 2025-06-15 RX ADMIN — ATENOLOL 25 MG: 25 TABLET ORAL at 09:23

## 2025-06-15 RX ADMIN — ACETAMINOPHEN 650 MG: 325 TABLET, FILM COATED ORAL at 18:47

## 2025-06-15 ASSESSMENT — ACTIVITIES OF DAILY LIVING (ADL)
ADLS_ACUITY_SCORE: 40
ADLS_ACUITY_SCORE: 36
ADLS_ACUITY_SCORE: 40
ADLS_ACUITY_SCORE: 36
ADLS_ACUITY_SCORE: 36
ADLS_ACUITY_SCORE: 40
ADLS_ACUITY_SCORE: 40
DEPENDENT_IADLS:: INDEPENDENT
ADLS_ACUITY_SCORE: 40
ADLS_ACUITY_SCORE: 36

## 2025-06-15 NOTE — TELEPHONE ENCOUNTER
11:56 PM - The pt is on a medical unit, there will be no overnight bed reviews.    R: MN  Access Inpatient Bed Call Log 6/15/25 at 12:00 AM:  Intake has called facilities that have not updated the bed status within the last 12 hours.  Adults:    [facility] is posting 0 beds.    Pt remains on the work list pending appropriate bed availability.

## 2025-06-15 NOTE — PLAN OF CARE
Goal Outcome Evaluation:                     VS: VSS and afebrile    O2: Stable in RA    Output: Voids without difficulty    Last BM: 6/13   Activity: Up with SBA    Skin: Bruise to R lower leg   Scab to L leg   Blanchable redness to both heels   Redness to nose bridge   Skin ckeck done with Viktoria BERNSTEIN    Pain: C/o back and hip pain- Tylenol given    Neuro: Alert and oriented x3   Dressing: None    Diet: Regular ,takes meds with apple sauce   LDA: None   Equipment: eyeglasses   Plan: Waiting for inpt psych bed availability    Additional Info: Pt has order CPAP,pt stated she does not use CPAP     Waiting for inpatient psych     Transfer from 5MS around 1830 per previous RN     Sitter at bedside

## 2025-06-15 NOTE — TELEPHONE ENCOUNTER
R: [place]  Access Inpatient Bed Call Log 06/15/2025 @ 3:15pm  Intake has called facilities that have not updated the bed status within the last 12 hours.           METRO:  [facility] is posting 0 beds.  [facility] is posting 0 beds. [phone] Per call @ 8:24 AM, no beds.   [facility] ([facility]) is posting 0 beds.   [facility] is posting 0 beds. No high school or eulogio psych. [phone] Per call @ 8:24 AM, step down beds only.  [facility] ([facility]) is posting 0 beds.  [facility] () is posting 0 beds. [phone]   [facility] is posting 6 beds. Ages 18-35, labs required. [phone] Per Fathi, they are at capacity today.   [facility] ([facility]) is posting 0 beds.  [facility] ([facility]) is posting 0 beds. [phone]       Pt remains on work list pending appropriate bed availability.

## 2025-06-15 NOTE — TELEPHONE ENCOUNTER
R: MN  Access Inpatient Bed Call Log 06/15/2025 @ 8:00 AM: (metro):  Intake has called facilities that have not updated the bed status within the last 12 hours.           [facility]:  [facility] is posting 0 beds.  [facility] is posting 0 beds. [phone] Per call @ 8:24 AM, no beds.   [facility] ([facility]) is posting 0 beds.   [facility] is posting  0 beds. No high school or [facility] psych. [phone] Per call @ 8:24 AM, step down beds only. Pt not appropriate as they do not offer eulogio psych.   [place] ([facility]) is posting  0 beds.  [facility] () is posting  0 beds. [phone]   [facility] is posting  6 beds. Ages 18-35, labs required. [phone] No answer @ 8:12 AM; pt is outside the accepted age range.   [facility] ([facility]) is posting  0 beds.  [facility] ([facility]) is posting  0 beds. [phone]     Pt remains on work list pending appropriate bed availability.

## 2025-06-15 NOTE — PROGRESS NOTES
[facility] - Internal Medicine Daily Note   Date of Service: 6/12/2025  Patient: Dominique Harkins  MRN: [id number]  Admission Date: 6/11/2025  Hospital Day # 4    Assessment & Plan  Dominique Harkins is a 79 year old female admitted on 6/11/2025. 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 She was brought to the ED via EMS for acute psychosis at home.      Acute psychosis, patient has an elated mood.  Not agitated.  Has bedside attendant in the room  Recent hospitalization for AMS and hypomania at [facility] (5/16-6/6/2025)  Bipolar disorder  Borderline personality disorder  Dependent personality disorder  OCD  Anxiety  PTSD  Insomnia   Presents to the ED after patient's  called 911 for acute psychotic behavior and unable to care for her at home. Patient was recently hospitalized at [facility] 5/16-6/6/2025 for AMS, hypomania and was discharged 4 days ago under the care of her . Patient's  says he found her in the seat of his car saying she needed to come to the hospital for tests and was exhibiting erratic and psychotic behavior. Labs largely unremarkable for infectious etiology. UA with leukocytes, 22 WBCs, negative nitrate. Previous head CT negative. At bedside, patient continues to exhibit tangential speech and disorganized thought processes.   - psychiatry consulted, appreciate assistance   - PTA regimen: clomipramine, lamotrigine, ativan, and seroquel   - give ceftiraxone 1g IM x1  - follow urine culture      COVID positive (5/31/2025) , SARS-CoV-2 isolation discontinued on 6/12.  Patient is not symptomatic  Chronic pulmonary infiltrates with prior recurrent pneumonia, suspected to be d/t chronic aspiration d/t  Zenker diverticulum (previously repaired 2017)  Suspected community-acquired pneumonia.  Patient currently does not have any productive cough.  Afebrile.  No leukocytosis.  Lungs clear to auscultation.  She was on IV Rocephin which was discontinued.  She likely had atypical bacterial pneumonia versus bronchitis.  EKG was obtained for a prolonged QT when she was started on azithromycin.  She received azithromycin 500 mg followed by 250 mg daily.     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 PTA regimen dulaglutide weekly, Toujeo 16 units nightly. While at [facility], was on glargine 16 units and lispro sliding scale.   - repeat A1c   - continue glargine 16 units   - start sliding scale insulin   - POCT glucose checks  - hypoglycemia protocol     GERD  - continue PTA PPI      Chronic pain  Follows with OP pain clinic. Previously failed oxycodone and dilaudid and recently started on Butrans per chart review however is not on current med list.   - continue to monitor      Hx of gastroparesis: gastric emptying study showing moderate to severe gastroparesis likely worsened by Trulicity, which was decreased by PCP on 4/25. Was not prescribed this while admitted to [facility], will hold here as well.            Diet: Regular Diet Adult  DVT Prophylaxis: DOAC  Lubin Catheter: Not present  Lines: None     Cardiac Monitoring: None  Code Status:        Micaela Murray MD  Internal Medicine Staff Hospitalist   [facility]   Pager: [phone]    Team: Selma Chandra 16  Page Cross Cover after 5 pm: pager [phone]   ___________________________________________________________________    Subjective & Interval Hx:    Patient with bipolar disorder  , OCD and panic disorder ,PTSD  Patient seen and examined  Hx of psychosis, elated mood   Psych re consulted   Hx of recent covid 19 infection and patient tested pos on 5/31  No respiratory symptoms at this  moment  CXR done reviewed, questionable pneumonia, right pleural effusion, trace  Discontinue IV ceftriaxone , start azithromycin mono therapy for possible atypical pneumonia   Patient is medically stable for discharge to inpatient psych      Last 24 hr care team notes reviewed.   ROS:  4 point ROS including Respiratory, CV, GI and , other than that noted in the HPI, is negative.    Medications: Reviewed in EPIC. List below for reference    Current Facility-Administered Medications:     acetaminophen (TYLENOL) tablet 650 mg, 650 mg, Oral, Q4H PRN, 650 mg at 06/14/25 2217 **OR** acetaminophen (TYLENOL) Suppository 650 mg, 650 mg, Rectal, Q4H PRN, Ashley Santos NP    albuterol (PROVENTIL) neb solution 2.5 mg, 2.5 mg, Nebulization, Q4H PRN, Ashley Santos NP, 2.5 mg at 06/12/25 0547    apixaban ANTICOAGULANT (ELIQUIS) tablet 2.5 mg, 2.5 mg, Oral, BID, Ashley Santos NP, 2.5 mg at 06/14/25 2019    atenolol (TENORMIN) tablet 25 mg, 25 mg, Oral, BID, Ashley Santos NP, 25 mg at 06/14/25 2019    azithromycin (ZITHROMAX) tablet 250 mg, 250 mg, Oral, Daily, Micaela Murray MD, 250 mg at 06/14/25 0816    calcium carbonate (TUMS) chewable tablet 1,000 mg, 1,000 mg, Oral, 4x Daily PRN, Ashley Santos, NP    clomiPRAMINE (ANAFRANIL) capsule 25 mg, 25 mg, Oral, At Bedtime, Zora Fung APRN CNP, 25 mg at 06/14/25 2217    clonazePAM (klonoPIN) half-tab 0.5 mg, 0.5 mg, Oral, At Bedtime, Zora Fung APRN CNP, 0.5 mg at 06/14/25 2218    glucose gel 15-30 g, 15-30 g, Oral, Q15 Min PRN **OR** dextrose 50 % injection 25-50 mL, 25-50 mL, Intravenous, Q15 Min PRN **OR** glucagon injection 1 mg, 1 mg, Subcutaneous, Q15 Min PRN, Ashley Santos, NP    diclofenac (VOLTAREN) 1 % topical  gel 4 g, 4 g, Topical, 4x Daily, Ashley Santos NP, 4 g at 06/14/25 2229    fluticasone (FLONASE) 50 MCG/ACT spray 1 spray, 1 spray, Both Nostrils, Daily PRN, Ashley Santos NP    furosemide (LASIX) tablet 20 mg, 20 mg, Oral, BID, Ashley Santos NP, 20 mg at 06/14/25 1704    insulin aspart (NovoLOG) injection (RAPID ACTING), 1-7 Units, Subcutaneous, TID AC, Ashley Santos NP, 1 Units at 06/14/25 1153    insulin aspart (NovoLOG) injection (RAPID ACTING), 1-5 Units, Subcutaneous, At Bedtime, Ashley Santos NP, 1 Units at 06/13/25 2154    insulin glargine (LANTUS PEN) injection 13 Units, 13 Units, Subcutaneous, QPM, David Leon MD, 13 Units at 06/14/25 2221    ketoconazole (NIZORAL) 2 % cream, , Topical, BID PRN, Ashley Santos NP    lamoTRIgine (LaMICtal) tablet 200 mg, 200 mg, Oral, BID, Rachael Rowland PA-C, 200 mg at 06/14/25 2019    lidocaine (LMX4) cream, , Topical, Q1H PRN, Ashley Santos NP    lidocaine 1 % 0.1-1 mL, 0.1-1 mL, Other, Q1H PRN, Ashley Santos NP    melatonin tablet 1 mg, 1 mg, Oral, At Bedtime PRN, Ashley Santos NP, 1 mg at 06/13/25 2154    montelukast (SINGULAIR) tablet 10 mg, 10 mg, Oral, Daily, Ashley Santos NP, 10 mg at 06/14/25 0816    olopatadine (PATANOL) 0.1 % ophthalmic solution 1 drop, 1 drop, Both Eyes, BID, Ashley Santso NP, 1 drop at 06/14/25 2218    ondansetron (ZOFRAN ODT) ODT tab 4 mg, 4 mg, Oral, Q6H PRN **OR** ondansetron (ZOFRAN) injection 4 mg, 4 mg, Intravenous, Q6H PRN, Ashley Santos NP    pantoprazole (PROTONIX) EC tablet 40 mg, 40 mg, Oral, BID AC, David Leon MD, 40 mg at 06/14/25 1705    Patient is already receiving anticoagulation with heparin, enoxaparin (LOVENOX), warfarin (COUMADIN)  or other anticoagulant medication, , Does not apply, Continuous PRN, Ashley Santos NP    pimecrolimus (ELIDEL) 1 % cream, , Topical, BID, Ashley Santos NP, Given at 06/14/25 0604     "polyethylene glycol (MIRALAX) Packet 17 g, 17 g, Oral, Daily, Ashley Santos NP, 17 g at 06/12/25 0803    psyllium (METAMUCIL/KONSYL) capsule 1 capsule, 1 capsule, Oral, Daily, Ashley Santos NP, 1 capsule at 06/14/25 0817    pyridOXINE (VITAMIN B6) tablet 100 mg, 100 mg, Oral, Daily, Ashley Santos NP, 100 mg at 06/14/25 0816    QUEtiapine (SEROquel) tablet 100 mg, 100 mg, Oral, At Bedtime, Zora Fung, APRN CNP, 100 mg at 06/14/25 2217    QUEtiapine (SEROquel) tablet 25 mg, 25 mg, Oral, BID, Rachael Rowland PA-C, 25 mg at 06/14/25 1704    risperiDONE (risperDAL) tablet 0.5 mg, 0.5 mg, Oral, BID PRN, Rachael Rowland PA-C, 0.5 mg at 06/15/25 0406    rosuvastatin (CRESTOR) tablet 20 mg, 20 mg, Oral, QPM, Ashley Santos NP, 20 mg at 06/14/25 2019    senna-docusate (SENOKOT-S/PERICOLACE) 8.6-50 MG per tablet 1 tablet, 1 tablet, Oral, BID PRN **OR** senna-docusate (SENOKOT-S/PERICOLACE) 8.6-50 MG per tablet 2 tablet, 2 tablet, Oral, BID PRN, Ashley Santos NP    sodium chloride (PF) 0.9% PF flush 3 mL, 3 mL, Intracatheter, Q8H Jerald GUO Ford Christian, MD    sodium chloride (PF) 0.9% PF flush 3 mL, 3 mL, Intracatheter, q1 min prn, [place] MD    sodium chloride (PF) 0.9% PF flush 3 mL, 3 mL, Intracatheter, Q8H Tom GUO Hannah, NP    sodium chloride (PF) 0.9% PF flush 3 mL, 3 mL, Intracatheter, q1 min prn, Ashley Santos NP    Vitamin D3 (CHOLECALCIFEROL) tablet 25 mcg, 25 mcg, Oral, Daily, Ashley Santos NP, 25 mcg at 06/14/25 0816    Physical Exam:    BP (!) 120/35 (BP Location: Right arm)   Pulse 57   Temp 97.7  F (36.5  C) (Oral)   Resp 17   Ht 1.707 m (5' 7.2\")   Wt 128.3 kg (282 lb 13.6 oz)   SpO2 96%   BMI 44.04 kg/m       GENERAL: Alert and oriented x 3. NAD.   HEENT: Anicteric sclera. Mucous membranes moist.   CV: RRR. S1, S2. No murmurs appreciated.   RESPIRATORY: Effort normal on RA. Lungs CTAB with no wheezing, rales, rhonchi. "   GI: Abdomen soft and non distended with normoactive bowel sounds present in all quadrants. No tenderness, rebound, guarding.   NEUROLOGICAL: No focal deficits. Moves all extremities.    EXTREMITIES: No peripheral edema. Intact bilateral pedal pulses.   SKIN: No jaundice. No rashes.     Labs & Studies of Note: I personally reviewed the following studies:  CBC RESULTS:   Recent Labs   Lab Test 06/12/25  0627   WBC 6.4   RBC 4.00   HGB 11.9   HCT 36.4   MCV 91   MCH 29.8   MCHC 32.7   RDW 12.8        Last Comprehensive Metabolic Panel:  Lab Results   Component Value Date     06/12/2025    POTASSIUM 3.9 06/12/2025    CHLORIDE 100 06/12/2025    CO2 23 06/12/2025    ANIONGAP 15 06/12/2025     (H) 06/15/2025    BUN 16.8 06/12/2025    CR 0.90 06/12/2025    GFRESTIMATED 65 06/12/2025    KHUSHI 9.1 06/12/2025       XR Chest Port 1 View   Final Result   IMPRESSION: Possible infection versus edema/trace right pleural   effusion at right lung base. Pacemaker. Bilateral reverse total   shoulder arthroplasty.      REBEL MUIR MD            SYSTEM ID:  [id number]

## 2025-06-15 NOTE — PLAN OF CARE
VS: VSS, except low grade fever, pt denied CP or SOB.    O2: Room air sat. > 90%.    Output: Voiding adequate amount in the bathroom.    Last BM: 06/12/25 passing gas.    Activity: Up with SBA and walker.    Skin: Visible skin intact full skin assessment not done.    Pain: Denied pain at this time.    Neuro: CMS and neuro intact to baseline.    Dressing: None.    Diet: Regular tolerating okay.    LDA: None.    Equipment: Walker, and personal belongings.    Plan: TBD.    Additional Info: Pt transfer from  med/surg about 06:30 pm, pt oriented to room and call light, makes need known, bedside attendant in the room for safety.

## 2025-06-15 NOTE — CONSULTS
Care Management Initial Consult    General Information  Assessment completed with: Spouse or significant other,  (David Harkins)  Type of CM/SW Visit: Initial Assessment    Primary Care Provider verified and updated as needed: Yes   Readmission within the last 30 days: previous discharge plan unsuccessful   Return Category: Medically unsuccessful treatment plan    Reason for Consult:  (Elevated Risk Score)  Advance Care Planning: Advance Care Planning Reviewed: no concerns identified          Communication Assessment  Patient's communication style: spoken language (English or Bilingual)    Hearing Difficulty or Deaf: no   Wear Glasses or Blind: yes    Cognitive  Cognitive/Neuro/Behavioral: .WDL except, mood/behavior, speech  Level of Consciousness: alert  Arousal Level: opens eyes spontaneously  Orientation: oriented x 4  Mood/Behavior: cooperative, calm  Best Language: 0 - No aphasia  Speech: spontaneous, pace/rate variance    Living Environment:   People in home: spouse   (David Harkins)  Current living Arrangements: house      Able to return to prior arrangements: no (IP Psych recommended)       Family/Social Support:  Care provided by: self  Provides care for: no one  Marital Status:   Support system:            Description of Support System: Supportive, Involved    Support Assessment: Adequate family and caregiver support, Adequate social supports    Current Resources:   Patient receiving home care services:  (Pt discharged with orders for home RN/PT/OT services, but [facility] did not have a chance to start care.)     Community Resources:  (Psychiatrist though [facility]; [facility]; nursing visits 1x/week though [facility] for med management - agency unknown; homemaking 5 hrs/week)    Equipment currently used at home:  (4WW, standard walker, manual WC, shower chair, and GBs in shower)    Supplies currently used at home:  (CPAP, pacemaker monitor, diabetic supplies)    Employment/Financial:  Employment  Status: retired     Employment/ Comments:  (Neither pt nor spouse served in the )  Financial Concerns: none   Referral to Financial Worker: No       Does the patient's insurance plan have a 3 day qualifying hospital stay waiver?  Yes     Which insurance plan 3 day waiver is available? Alternative insurance waiver    Will the waiver be used for post-acute placement? No    Lifestyle & Psychosocial Needs:  Social Drivers of Health     Food Insecurity: Low Risk  (6/13/2025)    Food Insecurity     Within the past 12 months, did you worry that your food would run out before you got money to buy more?: No     Within the past 12 months, did the food you bought just not last and you didn t have money to get more?: No   Depression: Not at risk (5/13/2025)    Received from [employer]    PHQ-2     PHQ-2 Score: 0   Housing Stability: Low Risk  (6/13/2025)    Housing Stability     Do you have housing? : Yes     Are you worried about losing your housing?: No   Tobacco Use: Low Risk  (4/25/2025)    Received from [employer] [facility] [employer]    Patient History     Smoking Tobacco Use: Never     Smokeless Tobacco Use: Never     Passive Exposure: Never   Financial Resource Strain: Low Risk  (6/13/2025)    Financial Resource Strain     Within the past 12 months, have you or your family members you live with been unable to get utilities (heat, electricity) when it was really needed?: No   Alcohol Use: Not on file   Transportation Needs: Low Risk  (6/13/2025)    Transportation Needs     Within the past 12 months, has lack of transportation kept you from medical appointments, getting your medicines, non-medical meetings or appointments, work, or from getting things that you need?: No   Physical Activity: Not on file   Interpersonal Safety: Low Risk  (6/13/2025)    Interpersonal Safety     Do you feel physically and emotionally safe where you currently live?: Yes     Within the past 12 months, have you been hit, slapped, kicked or  "otherwise physically hurt by someone?: No     Within the past 12 months, have you been humiliated or emotionally abused in other ways by your partner or ex-partner?: No   Stress: Not on file   Social Connections: Not on file   Health Literacy: Not on file       Functional Status:  Prior to admission patient needed assistance:   Dependent ADLs:: Independent  Dependent IADLs:: Independent  Assesssment of Functional Status: Not at  functional baseline    Mental Health Status:  Mental Health Status: Current Concern  Mental Health Management: Psychiatrist, Individual Therapy    Chemical Dependency Status:  Chemical Dependency Status:  (No h/o of chemical dependency)             Values/Beliefs:  Spiritual, Cultural Beliefs, Cheondoism Practices, Values that affect care: yes  Description of Beliefs that Will Affect Care:  (Pt identifies as Buddhist.)            Discussed  Partnership in Safe Discharge Planning  document with patient/family: No    Additional Information:  Per H&P, \"Dominique Harkins is a 79 year old female admitted on 6/11/2025. 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 She was brought to the ED via EMS for acute psychosis at home.\"    DEB completed Initial CMA with pt's spouse, David, via phone (see details documented above). Pt and spouse live in a single-level home, no [facility]. Prior to 05/15/25 IP Psych stay at [facility], pt was IND with all I/ADLs, drove self. David reported that pt has a [facility] CM, Rin ([place]?), based on information provided, writer assumes pt is on the EW, which is managed by said [facility] CM. Pt was receiving nurse visits 1x/week for medication set-up, and homemaking services 5hrs/week through the waiver (name of agencies " unknown). EW also provides incontinence supplies. David reported that has been in communication with Rin mcclelland: recent hospital stays and is working with her to secure PCA services for pt upon her return to home.    HANNA[facility] Mataah [place]  PH: [phone]  (Name and phone number not verified)          As of today, pt is #5 on the IP Psych wait list. No further Care Management intervention anticipated at this time. Please re-consult if further needs arise. Care Management signing off.               Christina Alex, JOSTINW, LSW  5 Ortho   [facility]  PHONE: [phone]  Available on Vocera:  5 Ortho SW

## 2025-06-16 ENCOUNTER — TELEPHONE (OUTPATIENT)
Dept: BEHAVIORAL HEALTH | Facility: CLINIC | Age: 80
End: 2025-06-16
Payer: COMMERCIAL

## 2025-06-16 LAB
GLUCOSE BLDC GLUCOMTR-MCNC: 131 MG/DL (ref 70–99)
GLUCOSE BLDC GLUCOMTR-MCNC: 219 MG/DL (ref 70–99)
GLUCOSE BLDC GLUCOMTR-MCNC: 232 MG/DL (ref 70–99)
GLUCOSE BLDC GLUCOMTR-MCNC: 248 MG/DL (ref 70–99)

## 2025-06-16 PROCEDURE — 250N000013 HC RX MED GY IP 250 OP 250 PS 637

## 2025-06-16 PROCEDURE — 250N000013 HC RX MED GY IP 250 OP 250 PS 637: Performed by: INTERNAL MEDICINE

## 2025-06-16 PROCEDURE — 94660 CPAP INITIATION&MGMT: CPT

## 2025-06-16 PROCEDURE — 120N000002 HC R&B MED SURG/OB UMMC

## 2025-06-16 PROCEDURE — 250N000012 HC RX MED GY IP 250 OP 636 PS 637

## 2025-06-16 PROCEDURE — 99232 SBSQ HOSP IP/OBS MODERATE 35: CPT | Performed by: INTERNAL MEDICINE

## 2025-06-16 PROCEDURE — 999N000157 HC STATISTIC RCP TIME EA 10 MIN

## 2025-06-16 RX ADMIN — PANTOPRAZOLE SODIUM 40 MG: 40 TABLET, DELAYED RELEASE ORAL at 17:17

## 2025-06-16 RX ADMIN — POLYETHYLENE GLYCOL 3350 17 G: 17 POWDER, FOR SOLUTION ORAL at 08:07

## 2025-06-16 RX ADMIN — ATENOLOL 25 MG: 25 TABLET ORAL at 08:08

## 2025-06-16 RX ADMIN — PIMECROLIMUS: 10 CREAM TOPICAL at 19:51

## 2025-06-16 RX ADMIN — OLOPATADINE HYDROCHLORIDE 1 DROP: 1 SOLUTION OPHTHALMIC at 08:17

## 2025-06-16 RX ADMIN — ACETAMINOPHEN 650 MG: 325 TABLET, FILM COATED ORAL at 12:24

## 2025-06-16 RX ADMIN — AZITHROMYCIN DIHYDRATE 250 MG: 250 TABLET ORAL at 08:08

## 2025-06-16 RX ADMIN — APIXABAN 2.5 MG: 2.5 TABLET, FILM COATED ORAL at 19:51

## 2025-06-16 RX ADMIN — Medication 100 MG: at 08:07

## 2025-06-16 RX ADMIN — APIXABAN 2.5 MG: 2.5 TABLET, FILM COATED ORAL at 08:08

## 2025-06-16 RX ADMIN — QUETIAPINE FUMARATE 25 MG: 25 TABLET ORAL at 17:17

## 2025-06-16 RX ADMIN — Medication 25 MCG: at 08:08

## 2025-06-16 RX ADMIN — ACETAMINOPHEN 650 MG: 325 TABLET, FILM COATED ORAL at 22:20

## 2025-06-16 RX ADMIN — ROSUVASTATIN 20 MG: 20 TABLET, FILM COATED ORAL at 19:51

## 2025-06-16 RX ADMIN — OLOPATADINE HYDROCHLORIDE 1 DROP: 1 SOLUTION OPHTHALMIC at 19:51

## 2025-06-16 RX ADMIN — LAMOTRIGINE 200 MG: 200 TABLET ORAL at 19:51

## 2025-06-16 RX ADMIN — PANTOPRAZOLE SODIUM 40 MG: 40 TABLET, DELAYED RELEASE ORAL at 08:08

## 2025-06-16 RX ADMIN — DICLOFENAC SODIUM TOPICAL GEL, 1% 4 G: 10 GEL TOPICAL at 08:17

## 2025-06-16 RX ADMIN — FUROSEMIDE 20 MG: 20 TABLET ORAL at 17:17

## 2025-06-16 RX ADMIN — QUETIAPINE FUMARATE 25 MG: 25 TABLET ORAL at 08:08

## 2025-06-16 RX ADMIN — DICLOFENAC SODIUM TOPICAL GEL, 1% 4 G: 10 GEL TOPICAL at 12:29

## 2025-06-16 RX ADMIN — ATENOLOL 25 MG: 25 TABLET ORAL at 20:00

## 2025-06-16 RX ADMIN — CLONAZEPAM 0.5 MG: 0.5 TABLET ORAL at 21:33

## 2025-06-16 RX ADMIN — PIMECROLIMUS: 10 CREAM TOPICAL at 08:19

## 2025-06-16 RX ADMIN — QUETIAPINE FUMARATE 100 MG: 50 TABLET ORAL at 21:33

## 2025-06-16 RX ADMIN — LAMOTRIGINE 200 MG: 200 TABLET ORAL at 08:08

## 2025-06-16 RX ADMIN — RISPERIDONE 0.5 MG: 0.5 TABLET, FILM COATED ORAL at 23:46

## 2025-06-16 RX ADMIN — DICLOFENAC SODIUM TOPICAL GEL, 1% 4 G: 10 GEL TOPICAL at 17:18

## 2025-06-16 RX ADMIN — MONTELUKAST 10 MG: 10 TABLET, FILM COATED ORAL at 08:08

## 2025-06-16 RX ADMIN — INSULIN GLARGINE 13 UNITS: 100 INJECTION, SOLUTION SUBCUTANEOUS at 19:50

## 2025-06-16 RX ADMIN — INSULIN ASPART 2 UNITS: 100 INJECTION, SOLUTION INTRAVENOUS; SUBCUTANEOUS at 12:31

## 2025-06-16 RX ADMIN — Medication 1 CAPSULE: at 08:08

## 2025-06-16 RX ADMIN — FUROSEMIDE 20 MG: 20 TABLET ORAL at 08:08

## 2025-06-16 RX ADMIN — RISPERIDONE 0.5 MG: 0.5 TABLET, FILM COATED ORAL at 00:43

## 2025-06-16 RX ADMIN — DICLOFENAC SODIUM TOPICAL GEL, 1% 4 G: 10 GEL TOPICAL at 19:51

## 2025-06-16 RX ADMIN — CLOMIPRAMINE HYDROCHLORIDE 25 MG: 25 CAPSULE ORAL at 21:33

## 2025-06-16 RX ADMIN — INSULIN ASPART 1 UNITS: 100 INJECTION, SOLUTION INTRAVENOUS; SUBCUTANEOUS at 21:33

## 2025-06-16 ASSESSMENT — ACTIVITIES OF DAILY LIVING (ADL)
ADLS_ACUITY_SCORE: 36
ADLS_ACUITY_SCORE: 41
ADLS_ACUITY_SCORE: 36
ADLS_ACUITY_SCORE: 41
ADLS_ACUITY_SCORE: 36
ADLS_ACUITY_SCORE: 41
ADLS_ACUITY_SCORE: 36
ADLS_ACUITY_SCORE: 36
ADLS_ACUITY_SCORE: 41
ADLS_ACUITY_SCORE: 36
ADLS_ACUITY_SCORE: 41
ADLS_ACUITY_SCORE: 36
ADLS_ACUITY_SCORE: 41

## 2025-06-16 NOTE — PROGRESS NOTES
Patient placed on hospital CPAP - auto titrate 4-20 until home cpap arrives.    Pt comfortable with settings and mask fit at this time. NST at bed side.

## 2025-06-16 NOTE — TELEPHONE ENCOUNTER
R: [place]  Access Inpatient Bed Call Log 06/16/2025 @ 7:11 am:  (metro):  Intake has called facilities that have not updated the bed status within the last 12 hours.              [facility] is posting 0 beds.             [facility] is posting 0 beds. [phone]; per call at 7:12 am to Reggie, they are at cap.    [facility] ([facility]) is posting 0 beds. [phone];              [facility] is posting 0 beds. No high school or eulogio psych. [phone]; per call at 7:13 am to Tanika, they are at cap.    [place] ([facility]) is posting 0 beds. [phone];              [facility] () is posting 0 beds. [phone]              [facility] is posting 6 beds. Ages 18-35, labs required. [phone]; per call at 7:15 am to Kaycee, they are at cap for y/a's and for adol/children.    [facility] ([facility]) is posting 0 beds. [phone].          [facility] ([facility]) is posting 0 beds. [phone]      Paged Chary at 1:06 pm.    Per Chary at 1:07 pm, pt is declined for 22 due to not being roommate appropriate.     Paged Julia at 2:12 pm.    Per Julia at 2:36 pm, he declines pt for station 20 due to her age and hx of medical issues and said he recommends 3b jean carlos.    3b jean carlos is currently at cap.                 Pt remains on work list pending appropriate bed availability.

## 2025-06-16 NOTE — PROGRESS NOTES
VS: Temp: 97.7  F (36.5  C) Temp src: Oral BP: 131/72 Pulse: 98   Resp: 18 SpO2: 97 % O2 Device: None (Room air)     Refused 2am BG   O2: Stable in RA  Infrequent cough, denies SOB    Output: Voids without difficulty per pt   Wears pull ups    Last BM: 6/15   Activity: Up to BR with SBA and FWW  Refused gaitbelt, steady feet    Skin: Redness to nose bridge   Scab to L lower leg  Bruise to R lower leg    Pain: Denies when asked    Neuro: Alert and oriented to self    Dressing: Mepilex to both heels- preventative    Diet: Regular, takes pill whole with apple sauce    LDA: None    Equipment: Eyeglasses    Plan: Waiting for inpatient psych bed opening    Additional Info: Sitter at bedside   Pt does not wear CPAP per pt report   Pt loves to wear eye mask and ear plug at night

## 2025-06-16 NOTE — PROGRESS NOTES
"  VS: /52 (BP Location: Right arm)   Pulse 61   Temp 97.5  F (36.4  C) (Oral)   Resp 18   Ht 1.707 m (5' 7.2\")   Wt 90.9 kg (200 lb 6.4 oz)   SpO2 94%   BMI 31.20 kg/m      O2: Room air saturations 94%. Pt has a productive cough. Pt was encouraged to deep breathe and sit up in chair to facilitate deep breathing and occasional cough.    Output: Up to bathroom. No issues with urine output. Pt prefers to wear pull up for urgency   Last BM: 6/15/2025 reports patient   Activity: Up with SBA and verbal coaching   Skin: Has dry skin under breast and abdominal folds and petra area. Applied topical as prescribed.    Pain: Pt complains of pain in both knees. Topical was applied to both knees per patient request. See MAR   CMS: Intact   Dressing: NA   Diet: Regular. Pt needs to be reminded that we want to check blood sugars before she eats.    LDA:    Equipment: 1;1  sitter   Plan: Pt stated\" My  has covid and so do I\" Talked with infection prevention at [phone] salvador Horn. She stated Pt was Covid positive over 3 weeks ago and her  was postive over 10 days ago so patient does not need to be in isolation and  is allowed to visit.    Additional Info: Pt became very tearful and anxious when trying to get her point across to staff. Eg\" No one is listening to me! I need a pull up now!\" Pt was explained staff was going out of room to get the pull ups and obtained extra in case of need. Pt was encouraged and reassured of plan of cares. Pt chose to lay back in bed after sitting up in chair for period of time. Pt definitely needs verbal coaching and reassurance with plan of cares simply.       "

## 2025-06-16 NOTE — PROGRESS NOTES
[facility] - Internal Medicine Daily Note   Date of Service: 6/12/2025  Patient: Dominique Harkins  MRN: [id number]  Admission Date: 6/11/2025  Hospital Day # 5    Assessment & Plan  Dominique Harkins is a 79 year old female admitted on 6/11/2025. 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 She was brought to the ED via EMS for acute psychosis at home.      Acute psychosis, patient has an elated mood.  Not agitated.  Has bedside attendant in the room. Awaiting bed availability for discharge to in patient psych. Psych re consulted, pending  Patient is medically ready for discharge     Recent hospitalization for AMS and hypomania at [facility] (5/16-6/6/2025)  Bipolar disorder  Borderline personality disorder  Dependent personality disorder  OCD  Anxiety  PTSD  Insomnia   Presents to the ED after patient's  called 911 for acute psychotic behavior and unable to care for her at home. Patient was recently hospitalized at [facility] 5/16-6/6/2025 for AMS, hypomania and was discharged 4 days ago under the care of her . Patient's  says he found her in the seat of his car saying she needed to come to the hospital for tests and was exhibiting erratic and psychotic behavior. Labs largely unremarkable for infectious etiology. UA with leukocytes, 22 WBCs, negative nitrate. Previous head CT negative. At bedside, patient continues to exhibit tangential speech and disorganized thought processes.   - psychiatry consulted, appreciate assistance   - PTA regimen: clomipramine, lamotrigine, ativan, and seroquel   - give ceftiraxone 1g IM x1  - follow urine culture      COVID positive (5/31/2025) , SARS-CoV-2 isolation discontinued on 6/12.   Patient is not symptomatic  Chronic pulmonary infiltrates with prior recurrent pneumonia, suspected to be d/t chronic aspiration d/t Zenker diverticulum (previously repaired 2017)  Suspected community-acquired pneumonia.  Patient currently does not have any productive cough.  Afebrile.  No leukocytosis.  Lungs clear to auscultation.  She was on IV Rocephin which was discontinued.  She likely had atypical bacterial pneumonia versus bronchitis.  EKG was obtained for a prolonged QT when she was started on azithromycin.  She received azithromycin 500 mg followed by 250 mg daily.     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 PTA regimen dulaglutide weekly, Toujeo 16 units nightly. While at [facility], was on glargine 16 units and lispro sliding scale.   - repeat A1c   - continue glargine 16 units   - start sliding scale insulin   - POCT glucose checks  - hypoglycemia protocol     GERD  - continue PTA PPI      Chronic pain  Follows with OP pain clinic. Previously failed oxycodone and dilaudid and recently started on Butrans per chart review however is not on current med list.   - continue to monitor      Hx of gastroparesis: gastric emptying study showing moderate to severe gastroparesis likely worsened by Trulicity, which was decreased by PCP on 4/25. Was not prescribed this while admitted to [facility], will hold here as well.            Diet: Regular Diet Adult  DVT Prophylaxis: DOAC  Lubin Catheter: Not present  Lines: None     Cardiac Monitoring: None  Code Status:          Disposition : Patient is medically ready for discharge     Micaela Murray MD  Internal Medicine Staff Hospitalist   [facility]   Pager: [phone]    Team:  [facility] [id number]  Page Cross Cover after 5 pm: pager [phone]   ___________________________________________________________________    Subjective & Interval Hx:    Patient with bipolar disorder , OCD and panic disorder ,PTSD  Patient seen and examined  Hx of psychosis, elated mood   Psych re consulted   Hx of recent covid 19 infection and patient tested pos on 5/31  No respiratory symptoms at this  moment  CXR done reviewed, questionable pneumonia, right pleural effusion, trace  Discontinue IV ceftriaxone , start azithromycin mono therapy for possible atypical pneumonia   Patient is medically stable for discharge to inpatient psych      Last 24 hr care team notes reviewed.   ROS:  4 point ROS including Respiratory, CV, GI and , other than that noted in the HPI, is negative.    Medications: Reviewed in EPIC. List below for reference    Current Facility-Administered Medications:     acetaminophen (TYLENOL) tablet 650 mg, 650 mg, Oral, Q4H PRN, 650 mg at 06/16/25 1224 **OR** acetaminophen (TYLENOL) Suppository 650 mg, 650 mg, Rectal, Q4H PRN, Ashley Santos NP    albuterol (PROVENTIL) neb solution 2.5 mg, 2.5 mg, Nebulization, Q4H PRN, Ashley Santos NP, 2.5 mg at 06/12/25 0547    apixaban ANTICOAGULANT (ELIQUIS) tablet 2.5 mg, 2.5 mg, Oral, BID, Ashley Santos NP, 2.5 mg at 06/16/25 0808    atenolol (TENORMIN) tablet 25 mg, 25 mg, Oral, BID, Ashley Santos NP, 25 mg at 06/16/25 0808    calcium carbonate (TUMS) chewable tablet 1,000 mg, 1,000 mg, Oral, 4x Daily PRN, Ashley Santos, NP    clomiPRAMINE (ANAFRANIL) capsule 25 mg, 25 mg, Oral, At Bedtime, Zora Fung, RACQUEL CNP, 25 mg at 06/15/25 2116    clonazePAM (klonoPIN) half-tab 0.5 mg, 0.5 mg, Oral, At Bedtime, Zora Fung, APRN CNP, 0.5 mg at 06/15/25 2116    glucose gel 15-30 g, 15-30 g, Oral, Q15 Min PRN **OR** dextrose 50 % injection 25-50 mL, 25-50 mL, Intravenous, Q15 Min PRN **OR** glucagon injection 1 mg, 1 mg,  Subcutaneous, Q15 Min PRN, Ashley Santos NP    diclofenac (VOLTAREN) 1 % topical gel 4 g, 4 g, Topical, 4x Daily, Ashley Santos NP, 4 g at 06/16/25 1229    fluticasone (FLONASE) 50 MCG/ACT spray 1 spray, 1 spray, Both Nostrils, Daily PRN, Ashley Santos NP    furosemide (LASIX) tablet 20 mg, 20 mg, Oral, BID, Ashley Santos NP, 20 mg at 06/16/25 0808    insulin aspart (NovoLOG) injection (RAPID ACTING), 1-7 Units, Subcutaneous, TID AC, Ashley Santos NP, 2 Units at 06/16/25 1231    insulin aspart (NovoLOG) injection (RAPID ACTING), 1-5 Units, Subcutaneous, At Bedtime, Ashley Santos NP, 1 Units at 06/13/25 2154    insulin glargine (LANTUS PEN) injection 13 Units, 13 Units, Subcutaneous, QPM, David Leon MD, 13 Units at 06/15/25 2020    ketoconazole (NIZORAL) 2 % cream, , Topical, BID PRN, Ashley Santos NP    lamoTRIgine (LaMICtal) tablet 200 mg, 200 mg, Oral, BID, Rachael Rowland PA-C, 200 mg at 06/16/25 0808    lidocaine (LMX4) cream, , Topical, Q1H PRN, Ashley Santos NP    lidocaine 1 % 0.1-1 mL, 0.1-1 mL, Other, Q1H PRN, Ashley Santos NP    melatonin tablet 1 mg, 1 mg, Oral, At Bedtime PRN, Ashley Santos NP, 1 mg at 06/13/25 2154    montelukast (SINGULAIR) tablet 10 mg, 10 mg, Oral, Daily, Ashley Santos NP, 10 mg at 06/16/25 0808    olopatadine (PATANOL) 0.1 % ophthalmic solution 1 drop, 1 drop, Both Eyes, BID, Ashley Santos NP, 1 drop at 06/16/25 0817    ondansetron (ZOFRAN ODT) ODT tab 4 mg, 4 mg, Oral, Q6H PRN **OR** ondansetron (ZOFRAN) injection 4 mg, 4 mg, Intravenous, Q6H PRN, Ashley Santos NP    pantoprazole (PROTONIX) EC tablet 40 mg, 40 mg, Oral, BID AC, David Leon MD, 40 mg at 06/16/25 0808    Patient is already receiving anticoagulation with heparin, enoxaparin (LOVENOX), warfarin (COUMADIN)  or other anticoagulant medication, , Does not apply, Continuous PRN, Ashley Santos NP    pimecrolimus  "(ELIDEL) 1 % cream, , Topical, BID, Ashley Santos NP, Given at 06/16/25 0819    polyethylene glycol (MIRALAX) Packet 17 g, 17 g, Oral, Daily, Ashley Santos NP, 17 g at 06/16/25 0807    psyllium (METAMUCIL/KONSYL) capsule 1 capsule, 1 capsule, Oral, Daily, Ashley Santos NP, 1 capsule at 06/16/25 0808    pyridOXINE (VITAMIN B6) tablet 100 mg, 100 mg, Oral, Daily, Ashley Santos NP, 100 mg at 06/16/25 0807    QUEtiapine (SEROquel) tablet 100 mg, 100 mg, Oral, At Bedtime, Zora Fung, RACQUEL CNP, 100 mg at 06/15/25 2116    QUEtiapine (SEROquel) tablet 25 mg, 25 mg, Oral, BID, Rachael Rowland PA-C, 25 mg at 06/16/25 0808    risperiDONE (risperDAL) tablet 0.5 mg, 0.5 mg, Oral, BID PRN, Rachael Rowland PA-C, 0.5 mg at 06/16/25 0043    rosuvastatin (CRESTOR) tablet 20 mg, 20 mg, Oral, QPM, Ashley Santos NP, 20 mg at 06/15/25 2016    senna-docusate (SENOKOT-S/PERICOLACE) 8.6-50 MG per tablet 1 tablet, 1 tablet, Oral, BID PRN **OR** senna-docusate (SENOKOT-S/PERICOLACE) 8.6-50 MG per tablet 2 tablet, 2 tablet, Oral, BID PRN, Ashley Santos NP    sodium chloride (PF) 0.9% PF flush 3 mL, 3 mL, Intracatheter, Q8H BRANT, [place] Mar[place] MD    sodium chloride (PF) 0.9% PF flush 3 mL, 3 mL, Intracatheter, q1 min prn, El Marques MD    sodium chloride (PF) 0.9% PF flush 3 mL, 3 mL, Intracatheter, Q8H BRANT, Ashley Santos NP    sodium chloride (PF) 0.9% PF flush 3 mL, 3 mL, Intracatheter, q1 min prn, Ashley Santos NP    Vitamin D3 (CHOLECALCIFEROL) tablet 25 mcg, 25 mcg, Oral, Daily, Ashley Santos NP, 25 mcg at 06/16/25 0808    Physical Exam:    /52 (BP Location: Right arm)   Pulse 61   Temp 97.5  F (36.4  C) (Oral)   Resp 18   Ht 1.707 m (5' 7.2\")   Wt 90.9 kg (200 lb 6.4 oz)   SpO2 94%   BMI 31.20 kg/m       GENERAL: Alert and oriented x 3. NAD.   HEENT: Anicteric sclera. Mucous membranes moist.   CV: RRR. S1, S2. No murmurs appreciated. "   RESPIRATORY: Effort normal on RA. Lungs CTAB with no wheezing, rales, rhonchi.   GI: Abdomen soft and non distended with normoactive bowel sounds present in all quadrants. No tenderness, rebound, guarding.   NEUROLOGICAL: No focal deficits. Moves all extremities.    EXTREMITIES: No peripheral edema. Intact bilateral pedal pulses.   SKIN: No jaundice. No rashes.     Labs & Studies of Note: I personally reviewed the following studies:  CBC RESULTS:   Recent Labs   Lab Test 06/12/25  0627   WBC 6.4   RBC 4.00   HGB 11.9   HCT 36.4   MCV 91   MCH 29.8   MCHC 32.7   RDW 12.8        Last Comprehensive Metabolic Panel:  Lab Results   Component Value Date     06/12/2025    POTASSIUM 3.9 06/12/2025    CHLORIDE 100 06/12/2025    CO2 23 06/12/2025    ANIONGAP 15 06/12/2025     (H) 06/16/2025    BUN 16.8 06/12/2025    CR 0.90 06/12/2025    GFRESTIMATED 65 06/12/2025    KHUSHI 9.1 06/12/2025       XR Chest Port 1 View   Final Result   IMPRESSION: Possible infection versus edema/trace right pleural   effusion at right lung base. Pacemaker. Bilateral reverse total   shoulder arthroplasty.      REBEL MUIR MD            SYSTEM ID:  [id number]

## 2025-06-16 NOTE — PLAN OF CARE
VS: VSS, except low grade fever, pt denied CP or SOB.    O2: Room air sat. > 90%.    Output: Voiding adequate amount in the bathroom.    Last BM: 06/12/25 passing gas.    Activity: Up with SBA and walker.    Skin: Intact except some antonia on  R shin.   Pain: Denied pain at this time.    Neuro: CMS and neuro intact to baseline.    Dressing: None.    Diet: Regular tolerating okay.    LDA: None.    Equipment: Walker, and personal belongings.    Plan: TBD.    Additional Info: Pt  reported to  patient's Pace maker is managed by [facility] but he canceled her appointment x 2, because of hospitalization, He will bring her Monitor when he feeling batter ( he has COVID ). MD and charge RN aware will follow up tomorrow.

## 2025-06-16 NOTE — PROGRESS NOTES
End of Shift Summary.     Changes this shift:   -CPAP brought to bedside  -CPAP removed CPAP due to discomfort    Neuro: Afebrile,Intermittent agitation/ discomfort  Cardiac: WNL  Respiratory: Says.   GI/: ***  Diet/appetite: Regular  Pain: deities pain and discomfort  Skin: Grossly impact  LDA's: Stable  Activities: [id number] in bed, SM-a    Drips:   none    Electrolytes: Plan: Awaiting tranfer to inpat

## 2025-06-16 NOTE — TELEPHONE ENCOUNTER
R: [place]  Access Inpatient Bed Call Log 06/16/2025 @12:00 AM:  Intake has called facilities that have not updated the bed status within the last 12 hours.         (Adult):    [facility] is posting 0 beds.  [facility] is posting 0 beds. [phone] Per call at 11:24 AM.  [facility] ([facility] is [facility] 0 beds.   [facility] is posting 0 beds. No high school or eulogio psych. [phone] Per call at 11:26 PM.  [facility] ([facility] is posting 0 beds. Per call at 11:28 AM, c/b after 8:00 AM.  [facility] () is posting 0 beds. [phone] per call at 11:50 PM.  [facility] is posting 0 beds. Ages 18-35, labs required. [phone] Per call at 12:27 AM.  [facility] ([facility]) is posting 0 beds.  [facility] ([facility]) is posting 0 beds. [phone]       Pt remains on work list pending appropriate bed availability.

## 2025-06-17 ENCOUNTER — TELEPHONE (OUTPATIENT)
Dept: BEHAVIORAL HEALTH | Facility: CLINIC | Age: 80
End: 2025-06-17
Payer: COMMERCIAL

## 2025-06-17 LAB
ATRIAL RATE - MUSE: 61 BPM
DIASTOLIC BLOOD PRESSURE - MUSE: NORMAL MMHG
GLUCOSE BLDC GLUCOMTR-MCNC: 176 MG/DL (ref 70–99)
GLUCOSE BLDC GLUCOMTR-MCNC: 189 MG/DL (ref 70–99)
GLUCOSE BLDC GLUCOMTR-MCNC: 190 MG/DL (ref 70–99)
GLUCOSE BLDC GLUCOMTR-MCNC: 206 MG/DL (ref 70–99)
GLUCOSE BLDC GLUCOMTR-MCNC: 220 MG/DL (ref 70–99)
INTERPRETATION ECG - MUSE: NORMAL
P AXIS - MUSE: NORMAL DEGREES
PR INTERVAL - MUSE: 348 MS
QRS DURATION - MUSE: 166 MS
QT - MUSE: 476 MS
QTC - MUSE: 479 MS
R AXIS - MUSE: -71 DEGREES
SYSTOLIC BLOOD PRESSURE - MUSE: NORMAL MMHG
T AXIS - MUSE: 108 DEGREES
VENTRICULAR RATE- MUSE: 61 BPM

## 2025-06-17 PROCEDURE — 120N000002 HC R&B MED SURG/OB UMMC

## 2025-06-17 PROCEDURE — 94660 CPAP INITIATION&MGMT: CPT

## 2025-06-17 PROCEDURE — 250N000013 HC RX MED GY IP 250 OP 250 PS 637

## 2025-06-17 PROCEDURE — 99232 SBSQ HOSP IP/OBS MODERATE 35: CPT

## 2025-06-17 PROCEDURE — 250N000013 HC RX MED GY IP 250 OP 250 PS 637: Performed by: INTERNAL MEDICINE

## 2025-06-17 PROCEDURE — 99232 SBSQ HOSP IP/OBS MODERATE 35: CPT | Performed by: INTERNAL MEDICINE

## 2025-06-17 RX ORDER — QUETIAPINE FUMARATE 50 MG/1
50 TABLET, FILM COATED ORAL 2 TIMES DAILY
Status: DISCONTINUED | OUTPATIENT
Start: 2025-06-17 | End: 2025-06-19

## 2025-06-17 RX ADMIN — ATENOLOL 25 MG: 25 TABLET ORAL at 21:01

## 2025-06-17 RX ADMIN — Medication 1 MG: at 23:47

## 2025-06-17 RX ADMIN — INSULIN ASPART 1 UNITS: 100 INJECTION, SOLUTION INTRAVENOUS; SUBCUTANEOUS at 21:05

## 2025-06-17 RX ADMIN — MONTELUKAST 10 MG: 10 TABLET, FILM COATED ORAL at 07:49

## 2025-06-17 RX ADMIN — INSULIN GLARGINE 13 UNITS: 100 INJECTION, SOLUTION SUBCUTANEOUS at 21:02

## 2025-06-17 RX ADMIN — Medication 1 MG: at 01:32

## 2025-06-17 RX ADMIN — Medication 100 MG: at 07:48

## 2025-06-17 RX ADMIN — ACETAMINOPHEN 650 MG: 325 TABLET, FILM COATED ORAL at 04:00

## 2025-06-17 RX ADMIN — CLOMIPRAMINE HYDROCHLORIDE 25 MG: 25 CAPSULE ORAL at 21:05

## 2025-06-17 RX ADMIN — INSULIN ASPART 1 UNITS: 100 INJECTION, SOLUTION INTRAVENOUS; SUBCUTANEOUS at 12:11

## 2025-06-17 RX ADMIN — DICLOFENAC SODIUM TOPICAL GEL, 1% 4 G: 10 GEL TOPICAL at 12:11

## 2025-06-17 RX ADMIN — LAMOTRIGINE 200 MG: 200 TABLET ORAL at 07:48

## 2025-06-17 RX ADMIN — POLYETHYLENE GLYCOL 3350 17 G: 17 POWDER, FOR SOLUTION ORAL at 07:49

## 2025-06-17 RX ADMIN — PANTOPRAZOLE SODIUM 40 MG: 40 TABLET, DELAYED RELEASE ORAL at 07:49

## 2025-06-17 RX ADMIN — RISPERIDONE 0.5 MG: 0.5 TABLET, FILM COATED ORAL at 13:30

## 2025-06-17 RX ADMIN — Medication 25 MCG: at 07:48

## 2025-06-17 RX ADMIN — APIXABAN 2.5 MG: 2.5 TABLET, FILM COATED ORAL at 21:02

## 2025-06-17 RX ADMIN — INSULIN ASPART 1 UNITS: 100 INJECTION, SOLUTION INTRAVENOUS; SUBCUTANEOUS at 07:56

## 2025-06-17 RX ADMIN — LAMOTRIGINE 200 MG: 200 TABLET ORAL at 21:01

## 2025-06-17 RX ADMIN — APIXABAN 2.5 MG: 2.5 TABLET, FILM COATED ORAL at 07:49

## 2025-06-17 RX ADMIN — FUROSEMIDE 20 MG: 20 TABLET ORAL at 07:49

## 2025-06-17 RX ADMIN — DICLOFENAC SODIUM TOPICAL GEL, 1% 4 G: 10 GEL TOPICAL at 08:00

## 2025-06-17 RX ADMIN — INSULIN ASPART 2 UNITS: 100 INJECTION, SOLUTION INTRAVENOUS; SUBCUTANEOUS at 16:17

## 2025-06-17 RX ADMIN — OLOPATADINE HYDROCHLORIDE 1 DROP: 1 SOLUTION OPHTHALMIC at 21:02

## 2025-06-17 RX ADMIN — ATENOLOL 25 MG: 25 TABLET ORAL at 07:48

## 2025-06-17 RX ADMIN — PIMECROLIMUS: 10 CREAM TOPICAL at 21:02

## 2025-06-17 RX ADMIN — FUROSEMIDE 20 MG: 20 TABLET ORAL at 17:22

## 2025-06-17 RX ADMIN — QUETIAPINE FUMARATE 25 MG: 25 TABLET ORAL at 07:48

## 2025-06-17 RX ADMIN — Medication 1 CAPSULE: at 07:48

## 2025-06-17 RX ADMIN — CLONAZEPAM 0.5 MG: 0.5 TABLET ORAL at 21:05

## 2025-06-17 RX ADMIN — PIMECROLIMUS: 10 CREAM TOPICAL at 08:02

## 2025-06-17 RX ADMIN — ROSUVASTATIN 20 MG: 20 TABLET, FILM COATED ORAL at 21:01

## 2025-06-17 RX ADMIN — OLOPATADINE HYDROCHLORIDE 1 DROP: 1 SOLUTION OPHTHALMIC at 07:56

## 2025-06-17 RX ADMIN — DICLOFENAC SODIUM TOPICAL GEL, 1% 4 G: 10 GEL TOPICAL at 21:02

## 2025-06-17 RX ADMIN — ACETAMINOPHEN 650 MG: 325 TABLET, FILM COATED ORAL at 13:30

## 2025-06-17 RX ADMIN — DICLOFENAC SODIUM TOPICAL GEL, 1% 4 G: 10 GEL TOPICAL at 17:25

## 2025-06-17 RX ADMIN — PANTOPRAZOLE SODIUM 40 MG: 40 TABLET, DELAYED RELEASE ORAL at 17:21

## 2025-06-17 RX ADMIN — ACETAMINOPHEN 650 MG: 325 TABLET, FILM COATED ORAL at 23:48

## 2025-06-17 RX ADMIN — QUETIAPINE FUMARATE 50 MG: 50 TABLET ORAL at 17:22

## 2025-06-17 RX ADMIN — QUETIAPINE FUMARATE 100 MG: 50 TABLET ORAL at 21:05

## 2025-06-17 ASSESSMENT — ACTIVITIES OF DAILY LIVING (ADL)
ADLS_ACUITY_SCORE: 47
ADLS_ACUITY_SCORE: 39
ADLS_ACUITY_SCORE: 47
ADLS_ACUITY_SCORE: 45
ADLS_ACUITY_SCORE: 47
ADLS_ACUITY_SCORE: 47
ADLS_ACUITY_SCORE: 39
ADLS_ACUITY_SCORE: 47
ADLS_ACUITY_SCORE: 39
ADLS_ACUITY_SCORE: 40
ADLS_ACUITY_SCORE: 47
ADLS_ACUITY_SCORE: 45
ADLS_ACUITY_SCORE: 45
ADLS_ACUITY_SCORE: 44
ADLS_ACUITY_SCORE: 47
ADLS_ACUITY_SCORE: 45
ADLS_ACUITY_SCORE: 47
ADLS_ACUITY_SCORE: 44
ADLS_ACUITY_SCORE: 47
ADLS_ACUITY_SCORE: 45
ADLS_ACUITY_SCORE: 39

## 2025-06-17 NOTE — PLAN OF CARE
Goal Outcome Evaluation: [phone]      Plan of Care Reviewed With: patient    Overall Patient Progress: no changeOverall Patient Progress: no change     Pt sleeping start of shift. Pt up intermittently through the night. Pt reported pain  managed by tylenol. Pt complained difficulties sleeping, melatonin administered. Pt accepted using CPAP overnight which helped with sleep. Pt incontinent overnight. Pt continues to talk a lot and continues to need a sitter for safety. Plan is inpatient psych when a bed is available. Continue with POC.

## 2025-06-17 NOTE — TELEPHONE ENCOUNTER
R: [place]  Access Inpatient Adult Bed Call Log  6/17/25 @ 1:00am   Intake has called facilities that have not updated their bed status within the last 12 hours.     *METRO:  [place] -- [facility]: @ capacity.  [facility]: @ cap per website. No reviews overnight. Review 8am-10pm #[phone]  [place] -- [facility]: @ cap per website. Low acuity. #[phone]  [place] -- [facility]: @ cap per website. Low acuity only. #[phone]  [place] -- [facility]: @ cap per website. #[phone]  [facility]: @ cap per website. #[phone]  [facility]/ beds: POSTING 6 BEDS. Ages 18-35, NO aggression/physical or sexual assault/violence hx, or drug abuse. #[phone]  [place] -- [facility]: @ cap per website. #[phone]  [place] -- [facility]: @ cap per website. #[phone]  [place] -- [facility]: @ cap per website. No reviews overnight. #[phone]     Pt remains on waitlist pending appropriate placement availability.

## 2025-06-17 NOTE — TELEPHONE ENCOUNTER
R: [place]  Access Inpatient Bed Call Log 06/16/2025 @ 4:25PM:    Intake has called facilities that have not updated the bed status within the last 12 hours.              [facility] is posting 0 beds.             [facility] is posting 0 beds. [phone]; per call at 7:12 am to Reggie, they are at cap.    [facility] ([facility]) is posting 0 beds. [phone].              [facility] is posting 0 beds. No high school or eulogio psych. [phone]; per call at 7:13 am to Tanika, they are at cap.    [place] ([facility] is posting 0 beds. [phone].              [facility] () is posting 0 beds. [phone]              [facility] is posting 6 beds. Ages 18-35, labs required. [phone]; per call at 7:15 am to Kaycee, they are at cap for y/a's and for adol/children.    [facility] ([facility]) is posting 0 beds. [phone].          [facility] ([facility]) is posting 0 beds. [phone].

## 2025-06-17 NOTE — PLAN OF CARE
"Goal Outcome Evaluation:      Plan of Care Reviewed With: patient    Overall Patient Progress: no changeOverall Patient Progress: no change       VS: /86   Pulse 65   Temp 98.5  F (36.9  C) (Oral)   Resp 16   Ht 1.707 m (5' 7.2\")   Wt 90.9 kg (200 lb 6.4 oz)   SpO2 99%   BMI 31.20 kg/m      O2: Stable in RA  Infrequent cough, denies SOB    Output: Voids without difficulty in bathroom   Last BM: 6/15   Activity: Standby assist with walker, refused gait belt. Steady.    Skin: Redness to nose bridge, under breasts and under abdominal folds.   Bruise to R lower leg    Pain: Tylenol given for generalized pain.    Neuro: Disoriented to situation. Flight of thoughts. Pt has illogical rambling continuously. Is cooperative with cares.    Dressing: Pt removed both mepilex's from heel. No redness on heels though.    Diet: Regular, takes pill whole with apple sauce    LDA: None    Plan: Waiting for inpatient psych bed opening    Additional Info: Sitter at bedside   Pt had a shower this evening.               "
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
## 2025-06-17 NOTE — TELEPHONE ENCOUNTER
"9:46 AM Jessica at [facility] can review for potential admission. Intake to fax full clinical.    10:14 AM Fax sent.    1:26 PM Per follow up call to [facility] Pt is still under review. Per Jessica If further info is needed reviewing nurses will follow up.    2:32 PM Amandeep Mejia is following patient.    2:41 PM Per Dr. Reis to email MRN for review.    2:46 PM Email sent.    3:09 PM Per Dr. Reis pt looks appropriate for 3B. However would like pt reviewed by Nurse Mgr Keen for appropriateness for shared before due to knowing [facility]au.    3:19 PM Writer messaged REGINA Keen regarding pt review for shared bed.    4:19 PM Per message from REGINA Keen \"Unable to admit this pt too acute needs single room which I don t have.\" Wl and admit board updated.    R:  [place]  Access Inpatient Bed Call Log 6/17/2025 8:28:42 AM  Intake has called facilities that have not updated the bed status within the last 12 hours.         (Adults);        [facility]:                [facility] is posting 0 beds.             [facility] is posting 0 beds. [phone] 8:38AM PER APS, AT [facility].  [facility] is posting 0 beds. Negative covid required.   [facility] is posting 0 beds. Neg covid. No high [facility]-psych. [phone] 8:37AM PER CHRISTINE CB AFTER 9:30AM.  [facility] is posting 0 beds. [phone]   [facility] is posting 0 bed. [phone]    [facility] is posting 6 beds. (Ages 18-35) Negative covid. [phone] 6:28AM PER KELSEY, NO CHILD OR TOMAS BEDS. HAVE EVERYTHING ELSE AVAILABLE.   [facility] is posting 0 beds.    [facility] is posting 0 beds [phone]      Pt remains on the work list pending appropriate bed availability.      "

## 2025-06-17 NOTE — PLAN OF CARE
"Goal Outcome Evaluation:      Plan of Care Reviewed With: patient    Overall Patient Progress: no changeOverall Patient Progress: no change       VS: /62 (BP Location: Right arm)   Pulse 70   Temp 97.6  F (36.4  C) (Oral)   Resp 18   Ht 1.707 m (5' 7.2\")   Wt 90.9 kg (200 lb 6.4 oz)   SpO2 97%   BMI 31.20 kg/m       O2: SpO2 > 90% on RA. Denied SOB/chest pain.    Output: Voiding adequately and spontaneously into toilet    Last BM: 6/15   Activity: SBA with walker and GB    Skin: Redness/rash to abdominal folds, pannus, and bridge of nose - scheduled cream applied    Pain: Managed with scheduled medications    CMS: Disoriented to situation. Cooperative, flight of ideas.    Dressing: None    Diet: Regular    LDA: None    Equipment: IV pole, personal belongings    Plan: Pending inpatient psych placement    Additional Info:                "

## 2025-06-17 NOTE — CARE PLAN
"[id number]    /43 (BP Location: Right arm)   Pulse 65   Temp 98.5  F (36.9  C) (Oral)   Resp 16   Ht 1.707 m (5' 7.2\")   Wt 90.9 kg (200 lb 6.4 oz)   SpO2 99%   BMI 31.20 kg/m        Pt A&O but pleasently confused, has elated behaviors like talking nonstop. Denied pain,SOB, CP and Nausea.  VSS on RA. Up SBA w/ walker. Voiding in bathroom.   On a sliding scale insulin ,BS this evening 131.  1:1 sitter at bedside for elopment precautions and safety. Awaiting placement into Psych unit. No IV Access . No new concerns this shift. Continue with plan of care.    Patsy Merlos RN    "

## 2025-06-18 ENCOUNTER — TELEPHONE (OUTPATIENT)
Dept: BEHAVIORAL HEALTH | Facility: CLINIC | Age: 80
End: 2025-06-18
Payer: COMMERCIAL

## 2025-06-18 LAB
GLUCOSE BLDC GLUCOMTR-MCNC: 152 MG/DL (ref 70–99)
GLUCOSE BLDC GLUCOMTR-MCNC: 176 MG/DL (ref 70–99)
GLUCOSE BLDC GLUCOMTR-MCNC: 189 MG/DL (ref 70–99)
GLUCOSE BLDC GLUCOMTR-MCNC: 190 MG/DL (ref 70–99)
GLUCOSE BLDC GLUCOMTR-MCNC: 202 MG/DL (ref 70–99)

## 2025-06-18 PROCEDURE — 250N000013 HC RX MED GY IP 250 OP 250 PS 637: Performed by: INTERNAL MEDICINE

## 2025-06-18 PROCEDURE — 99232 SBSQ HOSP IP/OBS MODERATE 35: CPT | Performed by: INTERNAL MEDICINE

## 2025-06-18 PROCEDURE — 250N000013 HC RX MED GY IP 250 OP 250 PS 637

## 2025-06-18 PROCEDURE — 120N000002 HC R&B MED SURG/OB UMMC

## 2025-06-18 RX ORDER — LIDOCAINE 4 G/G
1 PATCH TOPICAL
Status: DISCONTINUED | OUTPATIENT
Start: 2025-06-18 | End: 2025-06-24 | Stop reason: HOSPADM

## 2025-06-18 RX ORDER — CARBOXYMETHYLCELLULOSE SODIUM 5 MG/ML
1 SOLUTION/ DROPS OPHTHALMIC
Status: DISCONTINUED | OUTPATIENT
Start: 2025-06-18 | End: 2025-06-24 | Stop reason: HOSPADM

## 2025-06-18 RX ADMIN — Medication 25 MCG: at 08:11

## 2025-06-18 RX ADMIN — INSULIN ASPART 2 UNITS: 100 INJECTION, SOLUTION INTRAVENOUS; SUBCUTANEOUS at 12:09

## 2025-06-18 RX ADMIN — RISPERIDONE 0.5 MG: 0.5 TABLET, FILM COATED ORAL at 05:08

## 2025-06-18 RX ADMIN — INSULIN GLARGINE 13 UNITS: 100 INJECTION, SOLUTION SUBCUTANEOUS at 19:57

## 2025-06-18 RX ADMIN — RISPERIDONE 0.5 MG: 0.5 TABLET, FILM COATED ORAL at 16:11

## 2025-06-18 RX ADMIN — ACETAMINOPHEN 650 MG: 325 TABLET, FILM COATED ORAL at 12:28

## 2025-06-18 RX ADMIN — ACETAMINOPHEN 650 MG: 325 TABLET, FILM COATED ORAL at 05:08

## 2025-06-18 RX ADMIN — OLOPATADINE HYDROCHLORIDE 1 DROP: 1 SOLUTION OPHTHALMIC at 20:07

## 2025-06-18 RX ADMIN — LAMOTRIGINE 200 MG: 200 TABLET ORAL at 08:10

## 2025-06-18 RX ADMIN — QUETIAPINE FUMARATE 50 MG: 50 TABLET ORAL at 12:28

## 2025-06-18 RX ADMIN — ATENOLOL 25 MG: 25 TABLET ORAL at 08:11

## 2025-06-18 RX ADMIN — ACETAMINOPHEN 650 MG: 325 TABLET, FILM COATED ORAL at 21:16

## 2025-06-18 RX ADMIN — PIMECROLIMUS: 10 CREAM TOPICAL at 20:07

## 2025-06-18 RX ADMIN — PANTOPRAZOLE SODIUM 40 MG: 40 TABLET, DELAYED RELEASE ORAL at 16:06

## 2025-06-18 RX ADMIN — APIXABAN 2.5 MG: 2.5 TABLET, FILM COATED ORAL at 19:57

## 2025-06-18 RX ADMIN — ACETAMINOPHEN 650 MG: 325 TABLET, FILM COATED ORAL at 18:24

## 2025-06-18 RX ADMIN — APIXABAN 2.5 MG: 2.5 TABLET, FILM COATED ORAL at 08:11

## 2025-06-18 RX ADMIN — LAMOTRIGINE 200 MG: 200 TABLET ORAL at 19:59

## 2025-06-18 RX ADMIN — DICLOFENAC SODIUM TOPICAL GEL, 1% 4 G: 10 GEL TOPICAL at 08:12

## 2025-06-18 RX ADMIN — ATENOLOL 25 MG: 25 TABLET ORAL at 19:56

## 2025-06-18 RX ADMIN — INSULIN ASPART 1 UNITS: 100 INJECTION, SOLUTION INTRAVENOUS; SUBCUTANEOUS at 17:16

## 2025-06-18 RX ADMIN — FUROSEMIDE 20 MG: 20 TABLET ORAL at 16:06

## 2025-06-18 RX ADMIN — Medication 100 MG: at 08:10

## 2025-06-18 RX ADMIN — LIDOCAINE 4% 1 PATCH: 40 PATCH TOPICAL at 20:04

## 2025-06-18 RX ADMIN — QUETIAPINE FUMARATE 50 MG: 50 TABLET ORAL at 08:12

## 2025-06-18 RX ADMIN — PANTOPRAZOLE SODIUM 40 MG: 40 TABLET, DELAYED RELEASE ORAL at 08:12

## 2025-06-18 RX ADMIN — CLONAZEPAM 0.5 MG: 0.5 TABLET ORAL at 21:11

## 2025-06-18 RX ADMIN — PIMECROLIMUS: 10 CREAM TOPICAL at 08:14

## 2025-06-18 RX ADMIN — INSULIN ASPART 1 UNITS: 100 INJECTION, SOLUTION INTRAVENOUS; SUBCUTANEOUS at 08:24

## 2025-06-18 RX ADMIN — MONTELUKAST 10 MG: 10 TABLET, FILM COATED ORAL at 08:12

## 2025-06-18 RX ADMIN — FUROSEMIDE 20 MG: 20 TABLET ORAL at 08:11

## 2025-06-18 RX ADMIN — POLYETHYLENE GLYCOL 3350 17 G: 17 POWDER, FOR SOLUTION ORAL at 08:10

## 2025-06-18 RX ADMIN — Medication 1 MG: at 23:11

## 2025-06-18 RX ADMIN — DICLOFENAC SODIUM TOPICAL GEL, 1% 4 G: 10 GEL TOPICAL at 16:06

## 2025-06-18 RX ADMIN — QUETIAPINE FUMARATE 100 MG: 50 TABLET ORAL at 21:11

## 2025-06-18 RX ADMIN — DICLOFENAC SODIUM TOPICAL GEL, 1% 4 G: 10 GEL TOPICAL at 12:09

## 2025-06-18 RX ADMIN — ROSUVASTATIN 20 MG: 20 TABLET, FILM COATED ORAL at 19:57

## 2025-06-18 RX ADMIN — Medication 1 CAPSULE: at 08:11

## 2025-06-18 RX ADMIN — OLOPATADINE HYDROCHLORIDE 1 DROP: 1 SOLUTION OPHTHALMIC at 08:13

## 2025-06-18 ASSESSMENT — ACTIVITIES OF DAILY LIVING (ADL)
ADLS_ACUITY_SCORE: 43
ADLS_ACUITY_SCORE: 46
ADLS_ACUITY_SCORE: 43
ADLS_ACUITY_SCORE: 46
ADLS_ACUITY_SCORE: 43
ADLS_ACUITY_SCORE: 43
ADLS_ACUITY_SCORE: 46
ADLS_ACUITY_SCORE: 43

## 2025-06-18 NOTE — PROGRESS NOTES
"  VS: /52   Pulse 65   Temp 98.2  F (36.8  C) (Oral)   Resp 16   Ht 1.707 m (5' 7.2\")   Wt 89 kg (196 lb 3.4 oz)   SpO2 99%   BMI 30.55 kg/m      O2: RA   Output: Voids wearing brief   Last BM: 6/18   Activity: SBA with GB, walker   Skin: Redness in groin area   Pain: Complains of generalized pain   CMS: Intact, denies numbness/tingling   Dressing: none   Diet: reg   LDA: none   Equipment: GB, walker   Plan: Waiting for inpatient psych bed   Additional Info:       "
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
## 2025-06-18 NOTE — TELEPHONE ENCOUNTER
"S:  Pt is voluntary and wants [facility].    S:  [place] [facility] [place] Call Log 6/18/2025 9:00 AM:  Intake has called facilities that have not updated the bed status within the last 12 hours.                  [facility] is posting 0 beds.             [facility] is posting 0 beds. [phone]. NO OVERNIGHT REVIEWS.  [facility] is posting 0 beds. Negative covid required.   [facility] is posting 3 beds. Neg covid. No high school/Leah-psych. [phone]. Per call low acuity only.  [facility] is posting 0 beds. [phone]   [facility] is posting 0 bed. [phone]. Per call at 11:36 PM.     [facility] has \" some\" beds. (Ages 18-35) Negative covid. [phone]. Per call .9 AM.   [facility] is posting 0 beds.    [facility] is posting 0 beds [phone]           Per Jessica at [facility], They have no beds.  They Maybe will have a D.C. Monday.    Continue to seek placement.    Pt presented to 22.     declined.  He feels pt would be best served on 3B.  No 3B beds avlb.  Continue to seek placement.      "

## 2025-06-18 NOTE — PLAN OF CARE
Goal Outcome Evaluation:      Plan of Care Reviewed With: spouse          Outcome Evaluation: Pt will transfer to IP Psych once bed becomes available.

## 2025-06-18 NOTE — PROGRESS NOTES
[facility]    Medicine Progress Note - Hospitalist Service, GOLD TEAM 16    Date of Admission:  6/11/2025    Assessment & Plan   A: Patient is a 80 y/o woman who has a past medical history significant for bipolar disorder, borderline personality disorder, dependence personality disorder, OCD, anxiety, PTSD, insomnia, chronic pulmonary infiltrates, paroxysmal atrial fibrillation, sick sinus syndrome with pacemaker in place, hypertension, hyperlipidemia, chronic heart failure with preserved ejection fraction, COPD, asthma, past CVA, diabetes mellitus type II, GERD and chronic pain. Patient presented to the hospital on 11-Jun-2025 for acute psychosis.     Patient's  had called emergency medical services for acute psychotic behaviour and inability to care for patient at home. Patient had previously been hospitalized at [facility] from 16-May-2025 to 06-Jun-2025 for altered mental status and hypomania. Patient apparently was found in the seat of his car saying she needed to come to hospital for test; patient reportedly was exhibiting erratic behaviour.      P:  1.) Acute psychosis:  - Monitoring for safety.  - Patient has sitter at present.     2.) Bipolar disorder; borderline personality disorder; dependent personality disorder; OCD; anxiety; PTSD:  - Patient has been seen by Psychiatry.   - Patient currently on clomipramine 25 mg nightly, lamotrigine 200 mg twice daily, seroquel 50 mg twice daily, seroquel 100 mg nightly.   - Patient on clonazepam 0.5 mg nightly.      3.) Covid-19 positive:  - Patient completed isolation period on 12-Jun-2025.     4.) Chronic pulmonary infiltrates with prior recurrent pneumonia, suspected to be duet to chronic aspiration from Zenker's diverticulum (previously repaired in 2017):  - Monitoring for evidence of infection.     5.) Suspected community-acquired pneumonia:  - Patient has completed a course of azithromycin.     6.)  "Paroxysmal atrial fibrillation:  - Patient on eliquis for anticoagulation.     7.) Sick sinus syndrome; patient has pacemaker in place:  - To f/u as outpatient with Cardiology as scheduled.     8.) Hypertension:  - Patient on atenolol.     9.) Chronic heart failure with preserved ejection fraction:  - Patient on lasix.  - Monitoring for evidence of acute heart failure.     10.) Hyperlipidemia:  - Patient on crestor.     11.) COPD, asthma:  - Patient on singulair.  - Albuterol as needed.     12.) Diabetes mellitus type II with long-term use of insulin:  - On 12-Jun-2025, HbA1c was 8.1%.  - Patient usually on trulicity 3 mg weekly and toujeo 16 units every evening as outpatient..  - Patient currently on lantus 13 units every evening and insulin sliding scale.      13.) GERD:  - Patient on PPI.     14.) Chronic pain:  - Patient sees a [facility] Clinic as outpatient. Patient previously had failed oxycodone and hydromorphone. Patient reportedly was to be started on butrans but this was not reflected on patient's home medication list.  - Monitoring for symptoms.  - To f/u with Pain Clinic as outpatient.     15.) History of gastroparesis:  - Gastric emptying study reportedly showed moderate to severe gastroparesis likely worsened by trulicity. Trulicity dose had been decreased as outpatient on 25-Apr-2025. Trulicity now on hold.      16.) History of CVA:  - Patient on eliquis for anticoagulation for atrial fibrillation.  - Patient is on crestor for hyperlipidemia.              Diet: Regular Diet Adult    Lubin Catheter: Not present  Lines: None     Cardiac Monitoring: None  Code Status: Full Code      Clinically Significant Risk Factors                             # DMII: A1C = 8.1 % (Ref range: <5.7 %) within past 6 months   # Obesity: Estimated body mass index is 30.55 kg/m  as calculated from the following:    Height as of this encounter: 1.707 m (5' 7.2\").    Weight as of this encounter: 89 kg (196 lb 3.4 oz).      # " Financial/Environmental Concerns: none         Social Drivers of Health            Disposition Plan     Medically Ready for Discharge: Patient medically cleared for transfer to inpatient psychiatric unit.              Amandeep Cutler MD  Hospitalist Service, GOLD TEAM 16  [facility]  Securely message with [employer] (more info)  Text page via [employer] Paging/Directory   See signed in provider for up to date coverage information  ______________________________________________________________________    Interval History   Patient noted feeling well and noted no new problems.    Physical Exam   Vital Signs: Temp: 98.2  F (36.8  C) Temp src: Oral BP: 123/52 Pulse: 65   Resp: 16 SpO2: 99 % O2 Device: None (Room air)    Weight: 196 lbs 3.35 oz    General: Patient comfortable, NAD

## 2025-06-18 NOTE — PLAN OF CARE
Goal Outcome Evaluation:2300 -0700      Plan of Care Reviewed With: patient    Overall Patient Progress: no changeOverall Patient Progress: no change    Outcome Evaluation: Pt  A&Ox4, but pleasantly confused, on RA, required pain managment with PRN tylenol. PRN melatonin given for sleep. BG monitored at 2am.  up with SBA, cont. B/B, scattered brusing on lower extremities, no IV access, 1:1 sitter at bedside for safety, plan of care continues. waiting for inpatient psych placement

## 2025-06-18 NOTE — TELEPHONE ENCOUNTER
R: [place]  Access Inpatient [place] Call Log 6/18/2025 @ 5:35 PM:  Intake has called facilities that have not updated their bed status within the last 12 hours.    [facility] is posting 0 beds.                  [facility] is posting 0 beds. [phone]. Per Christie @ 5:41 PM, they are full  [facility] is posting 0 beds. Negative covid required.  [facility] is posting 0 beds. Neg covid. No high school/Leah-psych. [phone]. Per Benoit @ 5:41 PM, they are at capacity  [facility] is posting 0 beds. [phone].   [facility] is posting 0 beds. [phone].   [facility] is posting 6 beds. (Ages 18-35) Negative covid, no aggression, physical or sexual assault, violence hx or drug abuse, or psychosis.  [phone].   [facility] is posting 0 beds.   [facility] is posting 0 beds. [phone].     [facility] is posting 0 beds. [phone]. Per Angeles @ 5:45 PM, they are full    Pt remains on the work list pending appropriate bed availability.

## 2025-06-18 NOTE — PLAN OF CARE
Goal Outcome Evaluation:      Plan of Care Reviewed With: patient    Overall Patient Progress: no changeOverall Patient Progress: no change    Outcome Evaluation: pt remains alert, disoriented to situation. elated behaviors (singing, yelling, laughing) and flight of ideas/disorganized speech. cooperative with cares and meds. awaiting IP psych placement. sitter at bedside for safety and elopement behaviors. continue POC    VS: Temp: 98.7  F (37.1  C) Temp src: Oral BP: 134/50 Pulse: 66   Resp: 16 SpO2: 95 % O2 Device: None (Room air)       O2: SpO2>90% on room air, denies SOB and CP   Output: Voiding without difficulty in bathroom   Last BM: 6/15/2025   Activity: Up with SBA and walker, GB   Skin: Redness/rash to abdominal folds, pannus, and bridge of nose, scheduled ELIDEL cream applied   Pain: Bilateral joint pain (shoulders,knees, wrists) managed with scheduled voltaren cream, prn tylenol also available if needed   CMS: Alert, disoriented to situation. Flight of ideas, cooperative, labile   Dressing: N/A   Diet: Regular diet, denies nausea this davina   LDA: N/A   Equipment: IV pole, personal belongings, call light, sitter   Plan: Discharge pending IP psych placement. Continue POC   Additional Info:

## 2025-06-18 NOTE — TELEPHONE ENCOUNTER
STEFANO Patiño is reviewing        R:  [place]  Access Inpatient [place] Call Log 6/18/2025 12:00 AM:  Intake has called facilities that have not updated the bed status within the last 12 hours.         (Adults):          [facility]:                  [facility] is posting 0 beds.             [facility] is posting 0 beds. [phone]. NO OVERNIGHT REVIEWS.  [facility] is posting 0 beds. Negative covid required.   [facility] is posting 3 beds. Neg covid. No high school/Leah-psych. [phone]. Per call at 11:34 PM, can review on days for low acuity only.  [facility] is posting 0 beds. [phone]   [facility] is posting 0 bed. [phone]. Per call at 11:36 PM.     [facility] has 0 beds. (Ages 18-35) Negative covid. [phone]. Per call at 12:08 AM.   [facility] is posting 0 beds.    [facility] is posting 0 beds [phone]         Pt remains on the work list pending appropriate bed availability.

## 2025-06-19 ENCOUNTER — TELEPHONE (OUTPATIENT)
Dept: BEHAVIORAL HEALTH | Facility: CLINIC | Age: 80
End: 2025-06-19
Payer: COMMERCIAL

## 2025-06-19 VITALS
BODY MASS INDEX: 30.8 KG/M2 | SYSTOLIC BLOOD PRESSURE: 136 MMHG | HEART RATE: 73 BPM | DIASTOLIC BLOOD PRESSURE: 70 MMHG | OXYGEN SATURATION: 97 % | WEIGHT: 196.21 LBS | TEMPERATURE: 98.7 F | HEIGHT: 67 IN | RESPIRATION RATE: 16 BRPM

## 2025-06-19 LAB
GLUCOSE BLDC GLUCOMTR-MCNC: 173 MG/DL (ref 70–99)
GLUCOSE BLDC GLUCOMTR-MCNC: 179 MG/DL (ref 70–99)
GLUCOSE BLDC GLUCOMTR-MCNC: 185 MG/DL (ref 70–99)
GLUCOSE BLDC GLUCOMTR-MCNC: 268 MG/DL (ref 70–99)

## 2025-06-19 PROCEDURE — 250N000013 HC RX MED GY IP 250 OP 250 PS 637: Performed by: INTERNAL MEDICINE

## 2025-06-19 PROCEDURE — 120N000002 HC R&B MED SURG/OB UMMC

## 2025-06-19 PROCEDURE — 99232 SBSQ HOSP IP/OBS MODERATE 35: CPT | Performed by: INTERNAL MEDICINE

## 2025-06-19 PROCEDURE — 250N000013 HC RX MED GY IP 250 OP 250 PS 637

## 2025-06-19 RX ORDER — QUETIAPINE FUMARATE 50 MG/1
200 TABLET, FILM COATED ORAL AT BEDTIME
Status: CANCELLED | OUTPATIENT
Start: 2025-06-19

## 2025-06-19 RX ORDER — QUETIAPINE FUMARATE 100 MG/1
100 TABLET, FILM COATED ORAL 2 TIMES DAILY
Status: DISCONTINUED | OUTPATIENT
Start: 2025-06-19 | End: 2025-06-24 | Stop reason: HOSPADM

## 2025-06-19 RX ORDER — QUETIAPINE FUMARATE 100 MG/1
200 TABLET, FILM COATED ORAL AT BEDTIME
Status: DISCONTINUED | OUTPATIENT
Start: 2025-06-19 | End: 2025-06-23

## 2025-06-19 RX ORDER — QUETIAPINE FUMARATE 50 MG/1
100 TABLET, FILM COATED ORAL DAILY
Status: CANCELLED | OUTPATIENT
Start: 2025-06-20

## 2025-06-19 RX ORDER — METHOCARBAMOL 500 MG/1
500 TABLET, FILM COATED ORAL EVERY 6 HOURS PRN
Status: DISCONTINUED | OUTPATIENT
Start: 2025-06-19 | End: 2025-06-24 | Stop reason: HOSPADM

## 2025-06-19 RX ADMIN — INSULIN ASPART 1 UNITS: 100 INJECTION, SOLUTION INTRAVENOUS; SUBCUTANEOUS at 08:16

## 2025-06-19 RX ADMIN — INSULIN ASPART 1 UNITS: 100 INJECTION, SOLUTION INTRAVENOUS; SUBCUTANEOUS at 12:45

## 2025-06-19 RX ADMIN — ATENOLOL 25 MG: 25 TABLET ORAL at 08:06

## 2025-06-19 RX ADMIN — ROSUVASTATIN 20 MG: 20 TABLET, FILM COATED ORAL at 20:37

## 2025-06-19 RX ADMIN — Medication 1 MG: at 23:41

## 2025-06-19 RX ADMIN — LAMOTRIGINE 200 MG: 200 TABLET ORAL at 20:37

## 2025-06-19 RX ADMIN — Medication 1 CAPSULE: at 08:06

## 2025-06-19 RX ADMIN — LAMOTRIGINE 200 MG: 200 TABLET ORAL at 08:05

## 2025-06-19 RX ADMIN — OLOPATADINE HYDROCHLORIDE 1 DROP: 1 SOLUTION OPHTHALMIC at 20:43

## 2025-06-19 RX ADMIN — INSULIN ASPART 2 UNITS: 100 INJECTION, SOLUTION INTRAVENOUS; SUBCUTANEOUS at 21:49

## 2025-06-19 RX ADMIN — PANTOPRAZOLE SODIUM 40 MG: 40 TABLET, DELAYED RELEASE ORAL at 17:26

## 2025-06-19 RX ADMIN — DICLOFENAC SODIUM TOPICAL GEL, 1% 4 G: 10 GEL TOPICAL at 20:42

## 2025-06-19 RX ADMIN — MONTELUKAST 10 MG: 10 TABLET, FILM COATED ORAL at 08:06

## 2025-06-19 RX ADMIN — ACETAMINOPHEN 650 MG: 325 TABLET, FILM COATED ORAL at 12:44

## 2025-06-19 RX ADMIN — FUROSEMIDE 20 MG: 20 TABLET ORAL at 17:26

## 2025-06-19 RX ADMIN — ATENOLOL 25 MG: 25 TABLET ORAL at 20:37

## 2025-06-19 RX ADMIN — QUETIAPINE FUMARATE 200 MG: 100 TABLET ORAL at 21:42

## 2025-06-19 RX ADMIN — DICLOFENAC SODIUM TOPICAL GEL, 1% 4 G: 10 GEL TOPICAL at 17:28

## 2025-06-19 RX ADMIN — DOCUSATE SODIUM 50 MG AND SENNOSIDES 8.6 MG 1 TABLET: 8.6; 5 TABLET, FILM COATED ORAL at 09:58

## 2025-06-19 RX ADMIN — DICLOFENAC SODIUM TOPICAL GEL, 1% 4 G: 10 GEL TOPICAL at 08:20

## 2025-06-19 RX ADMIN — ACETAMINOPHEN 650 MG: 325 TABLET, FILM COATED ORAL at 08:32

## 2025-06-19 RX ADMIN — RISPERIDONE 0.5 MG: 0.5 TABLET, FILM COATED ORAL at 12:44

## 2025-06-19 RX ADMIN — Medication 25 MCG: at 08:04

## 2025-06-19 RX ADMIN — OLOPATADINE HYDROCHLORIDE 1 DROP: 1 SOLUTION OPHTHALMIC at 09:54

## 2025-06-19 RX ADMIN — PIMECROLIMUS: 10 CREAM TOPICAL at 20:42

## 2025-06-19 RX ADMIN — QUETIAPINE FUMARATE 100 MG: 100 TABLET ORAL at 18:24

## 2025-06-19 RX ADMIN — QUETIAPINE FUMARATE 50 MG: 50 TABLET ORAL at 08:06

## 2025-06-19 RX ADMIN — APIXABAN 2.5 MG: 2.5 TABLET, FILM COATED ORAL at 20:37

## 2025-06-19 RX ADMIN — APIXABAN 2.5 MG: 2.5 TABLET, FILM COATED ORAL at 08:05

## 2025-06-19 RX ADMIN — CLONAZEPAM 0.5 MG: 0.5 TABLET ORAL at 21:42

## 2025-06-19 RX ADMIN — Medication 100 MG: at 08:05

## 2025-06-19 RX ADMIN — PIMECROLIMUS: 10 CREAM TOPICAL at 09:54

## 2025-06-19 RX ADMIN — DICLOFENAC SODIUM TOPICAL GEL, 1% 4 G: 10 GEL TOPICAL at 12:45

## 2025-06-19 RX ADMIN — LIDOCAINE 4% 1 PATCH: 40 PATCH TOPICAL at 20:37

## 2025-06-19 RX ADMIN — RISPERIDONE 0.5 MG: 0.5 TABLET, FILM COATED ORAL at 01:05

## 2025-06-19 RX ADMIN — ACETAMINOPHEN 650 MG: 325 TABLET, FILM COATED ORAL at 17:26

## 2025-06-19 RX ADMIN — INSULIN GLARGINE 13 UNITS: 100 INJECTION, SOLUTION SUBCUTANEOUS at 21:42

## 2025-06-19 RX ADMIN — FUROSEMIDE 20 MG: 20 TABLET ORAL at 08:05

## 2025-06-19 RX ADMIN — PANTOPRAZOLE SODIUM 40 MG: 40 TABLET, DELAYED RELEASE ORAL at 08:05

## 2025-06-19 RX ADMIN — INSULIN ASPART 1 UNITS: 100 INJECTION, SOLUTION INTRAVENOUS; SUBCUTANEOUS at 17:28

## 2025-06-19 ASSESSMENT — ACTIVITIES OF DAILY LIVING (ADL)
ADLS_ACUITY_SCORE: 46
ADLS_ACUITY_SCORE: 47
ADLS_ACUITY_SCORE: 46
ADLS_ACUITY_SCORE: 47
ADLS_ACUITY_SCORE: 47
ADLS_ACUITY_SCORE: 46
ADLS_ACUITY_SCORE: 47
ADLS_ACUITY_SCORE: 46
ADLS_ACUITY_SCORE: 47
ADLS_ACUITY_SCORE: 46
ADLS_ACUITY_SCORE: 47
ADLS_ACUITY_SCORE: 47
ADLS_ACUITY_SCORE: 46
ADLS_ACUITY_SCORE: 46
ADLS_ACUITY_SCORE: 47
ADLS_ACUITY_SCORE: 47
ADLS_ACUITY_SCORE: 46
ADLS_ACUITY_SCORE: 47

## 2025-06-19 NOTE — CONSULTS
"      Psychiatry Consultation; Follow up              Reason for Consult, requesting source:    Follow up  Requesting source: David Leon    Labs and imaging reviewed              Interim history:    Per staff, needed Risperidone PRN at 1 a due to agitation.    Dominique reports morning episode of agitation, she mentions to have felt that \"people was not listening to her\" and in no control, but she was able to calm herself down afterwards. She is currently in a good mood and denies other symptoms beside persistent generalized pain and some lingering anxiety due to agitation in the morning.         Current Medications:     Current Facility-Administered Medications   Medication Dose Route Frequency Provider Last Rate Last Admin    apixaban ANTICOAGULANT (ELIQUIS) tablet 2.5 mg  2.5 mg Oral BID Ashley Santos NP   2.5 mg at 06/19/25 0805    atenolol (TENORMIN) tablet 25 mg  25 mg Oral BID Ashley Santos NP   25 mg at 06/19/25 0806    clonazePAM (klonoPIN) half-tab 0.5 mg  0.5 mg Oral At Bedtime Zora Fung APRN CNP   0.5 mg at 06/18/25 2111    diclofenac (VOLTAREN) 1 % topical gel 4 g  4 g Topical 4x Daily Ashley Santos NP   4 g at 06/19/25 0820    furosemide (LASIX) tablet 20 mg  20 mg Oral BID Ashley Santos NP   20 mg at 06/19/25 0805    insulin aspart (NovoLOG) injection (RAPID ACTING)  1-7 Units Subcutaneous TID AC Ashley Santos NP   1 Units at 06/19/25 0816    insulin aspart (NovoLOG) injection (RAPID ACTING)  1-5 Units Subcutaneous At Bedtime Ashley Santos NP   1 Units at 06/17/25 2105    insulin glargine (LANTUS PEN) injection 13 Units  13 Units Subcutaneous QPM David Leon MD   13 Units at 06/18/25 1957    lamoTRIgine (LaMICtal) tablet 200 mg  200 mg Oral BID Rachael Rowland PA-C   200 mg at 06/19/25 0805    Lidocaine (LIDOCARE) 4 % Patch 1 patch  1 patch Transdermal Q24h Amandeep Cutelr MD   1 patch at 06/18/25 2004    montelukast (SINGULAIR) tablet 10 mg  10 mg " Oral Daily Ashley Santos NP   10 mg at 06/19/25 0806    olopatadine (PATANOL) 0.1 % ophthalmic solution 1 drop  1 drop Both Eyes BID Ashley Santos NP   1 drop at 06/19/25 0954    pantoprazole (PROTONIX) EC tablet 40 mg  40 mg Oral BID AC David Leon MD   40 mg at 06/19/25 0805    pimecrolimus (ELIDEL) 1 % cream   Topical BID Ashley Santos NP   Given at 06/19/25 0954    polyethylene glycol (MIRALAX) Packet 17 g  17 g Oral Daily Ashley Santos NP   17 g at 06/18/25 0810    psyllium (METAMUCIL/KONSYL) capsule 1 capsule  1 capsule Oral Daily Ashley Santos NP   1 capsule at 06/19/25 0806    pyridOXINE (VITAMIN B6) tablet 100 mg  100 mg Oral Daily Ashley Santos NP   100 mg at 06/19/25 0805    QUEtiapine (SEROquel) tablet 100 mg  100 mg Oral At Bedtime Zora Fung APRN CNP   100 mg at 06/18/25 2111    QUEtiapine (SEROquel) tablet 50 mg  50 mg Oral BID Zora Fung APRN CNP   50 mg at 06/19/25 0806    rosuvastatin (CRESTOR) tablet 20 mg  20 mg Oral QPM Ashley Santos NP   20 mg at 06/18/25 1957    Vitamin D3 (CHOLECALCIFEROL) tablet 25 mcg  25 mcg Oral Daily Ashley Santos NP   25 mcg at 06/19/25 0804              MSE:   Appearance: awake, alert  Attitude:  cooperative  Eye Contact:  fair  Mood:  anxious and better  Affect:  mood congruent, intensity is heightened, and labile  Speech:  clear, coherent and rambling  Psychomotor Behavior:  no evidence of tardive dyskinesia, dystonia, or tics  Muscle strength and tone: normal  Thought Process:  disorganized and tangental  Associations:  no loose associations  Thought Content:  no evidence of suicidal ideation or homicidal ideation  Insight:  limited  Judgement:  limited  Oriented to:  time, person, and place  Attention Span and Concentration:  fair  Recent and Remote Memory:  fair    Vital signs:  Temp: 97.6  F (36.4  C) Temp src: Oral BP: 119/44 Pulse: 67   Resp: 16 SpO2: 96 % O2 Device: None (Room air)    "Height: 170.7 cm (5' 7.2\") Weight: 89 kg (196 lb 3.4 oz)  Estimated body mass index is 30.55 kg/m  as calculated from the following:    Height as of this encounter: 1.707 m (5' 7.2\").    Weight as of this encounter: 89 kg (196 lb 3.4 oz).             DSM-5 Diagnosis:   Bipolar 2 disorder  OCD  Panic disorder  PTSD  Insomnia              Assessment:   79-year-old female with a history of mental health issues including bipolar disorder, history of paroxysmal A-fib with previous CVA and chronic anticoagulation, as well as COPD and CHF, who presents to the emergency department by ambulance after the patient was found in the seat of the 's car wanting to get some tests at the hospital but with obvious psychotic behavior.      Today Dominique still presents with mildly disorganized behavior and mood lability. He is still pending inpt psychiatry bed. Currently there is no evidence of cognitive deficiency, but still lingering shalonda. She is not holdable at this moment and she does not present a risk for herself or others. But if she becomes more agitated, intrusive, or tries to leave AMA put on a 72 hh. We discussed condition with , he mentions that she is still not at her baseline. We recommend continuing up-titration of Seroquel dose.          Summary of Recommendations:   Legal: voluntary  Safety: 1:1  Labs/Studies: none  Medications:  --Klonopin 0.5mg at bedtime   --Seroquel 100 mg BID in the morning and 200 mg at bedtime.  --Lamictal 200mg BID  -- If she tries to leave AMA 72hh     Follow up: tomorrow     This patient was seen and discussed with my attending physician.  Erika Gutierrez MD  [facility] Psychiatry Resident             "

## 2025-06-19 NOTE — PLAN OF CARE
"Goal Outcome Evaluation:      Plan of Care Reviewed With: patient    Overall Patient Progress: no change       VS: /50 (BP Location: Right arm)   Pulse 65   Temp 98.8  F (37.1  C) (Oral)   Resp 16   Ht 1.707 m (5' 7.2\")   Wt 89 kg (196 lb 3.4 oz)   SpO2 99%   BMI 30.55 kg/m       O2: RA   Output: Voids in the bathroom. Wears brief.    Last BM: 06/18/25   Activity: Up with SBA gait belt and walker    Skin: Redness to groin folds.    Pain: Left lower back pain near L hip managed with lidocaine patch   Generalized pain managed with prn tylenol   CMS: Intact. Disoriented to situation. Denies N/T   Dressing: none   Diet: regular   LDA: none   Equipment: Gait belt and IV pole   Plan: Waiting for in patient psych bed   Additional Info:            "

## 2025-06-19 NOTE — PROGRESS NOTES
[facility]    Medicine Progress Note - Hospitalist Service, GOLD TEAM 16    Date of Admission:  6/11/2025    Assessment & Plan   A: Patient is a 78 y/o woman who has a past medical history significant for bipolar disorder, borderline personality disorder, dependence personality disorder, OCD, anxiety, PTSD, insomnia, chronic pulmonary infiltrates, paroxysmal atrial fibrillation, sick sinus syndrome with pacemaker in place, hypertension, hyperlipidemia, chronic heart failure with preserved ejection fraction, COPD, asthma, past CVA, diabetes mellitus type II, GERD and chronic pain. Patient presented to the hospital on 11-Jun-2025 for acute psychosis.     Patient's  had called emergency medical services for acute psychotic behaviour and inability to care for patient at home. Patient had previously been hospitalized at [facility] from 16-May-2025 to 06-Jun-2025 for altered mental status and hypomania. Patient apparently was found in the seat of his car saying she needed to come to hospital for test; patient reportedly was exhibiting erratic behaviour.      P:  1.) Acute psychosis:  - Monitoring for safety.  - Patient has sitter at present.     2.) Bipolar disorder; borderline personality disorder; dependent personality disorder; OCD; anxiety; PTSD:  - Patient has been seen by Psychiatry.   - Patient currently on lamotrigine 200 mg twice daily, seroquel 100 mg twice daily, seroquel 200 mg at bedtime.   - Patient no longer on clomipramine.  - Patient on clonazepam 0.5 mg nightly.      3.) Covid-19 positive:  - Patient completed isolation period on 12-Jun-2025.     4.) Chronic pulmonary infiltrates with prior recurrent pneumonia, suspected to be duet to chronic aspiration from Zenker's diverticulum (previously repaired in 2017):  - Monitoring for evidence of infection.     5.) Suspected community-acquired pneumonia:  - Patient has completed a course of  "azithromycin.     6.) Paroxysmal atrial fibrillation:  - Patient on eliquis for anticoagulation.     7.) Sick sinus syndrome; patient has pacemaker in place:  - To f/u as outpatient with Cardiology as scheduled.     8.) Hypertension:  - Patient on atenolol.     9.) Chronic heart failure with preserved ejection fraction:  - Patient on lasix.  - Monitoring for evidence of acute heart failure.     10.) Hyperlipidemia:  - Patient on crestor.     11.) COPD, asthma:  - Patient on singulair.  - Albuterol as needed.     12.) Diabetes mellitus type II with long-term use of insulin:  - On 12-Jun-2025, HbA1c was 8.1%.  - Patient usually on trulicity 3 mg weekly and toujeo 16 units every evening as outpatient..  - Patient currently on lantus 13 units every evening and insulin sliding scale.      13.) GERD:  - Patient on PPI.     14.) Chronic pain:  - Patient sees a Pain Clinic as outpatient. Patient previously had failed oxycodone and hydromorphone. Patient reportedly was to be started on butrans but this was not reflected on patient's home medication list.  - Monitoring for symptoms.  - To f/u with Pain Clinic as outpatient.     15.) History of gastroparesis:  - Gastric emptying study reportedly showed moderate to severe gastroparesis likely worsened by trulicity. Trulicity dose had been decreased as outpatient on 25-Apr-2025. Trulicity now on hold.      16.) History of CVA:  - Patient on eliquis for anticoagulation for atrial fibrillation.  - Patient is on crestor for hyperlipidemia.           Diet: Regular Diet Adult    Lubin Catheter: Not present  Lines: None     Cardiac Monitoring: None  Code Status: Full Code      Clinically Significant Risk Factors                             # DMII: A1C = 8.1 % (Ref range: <5.7 %) within past 6 months   # Obesity: Estimated body mass index is 30.55 kg/m  as calculated from the following:    Height as of this encounter: 1.707 m (5' 7.2\").    Weight as of this encounter: 89 kg (196 lb 3.4 " oz).      # Financial/Environmental Concerns: none         Social Drivers of Health            Disposition Plan     Medically Ready for Discharge: Patient medically cleared for transfer to inpatient psychiatric unit.              Amandeep Cutler MD  Hospitalist Service, GOLD TEAM 16  [facility]  Securely message with [employer] (more info)  Text page via [facility] Paging/Directory   See signed in provider for up to date coverage information  ______________________________________________________________________    Interval History   Patient noted discomfort in her back and in both shoulders.    Physical Exam   Vital Signs: Temp: 98.9  F (37.2  C) Temp src: Oral BP: (!) 163/103 Pulse: 64   Resp: 18 SpO2: 97 % O2 Device: None (Room air)    Weight: 196 lbs 3.35 oz    General: Patient NAD. Tearful.

## 2025-06-19 NOTE — TELEPHONE ENCOUNTER
R: MN  Access Inpatient Bed Call Log 6/19/2025 @ 7:30AM:    Intake has called facilities that have not updated the bed status within the last 12 hours.               [facility] is posting 0 beds.  PT recommended for 3B only, and per NM- needs pvt room, none available.   [facility] is posting 0 beds. [phone]; Per Sariah at 10:11 am at [facility], they are at cap  [facility] ([facility]) is posting 0 beds. [phone];     [facility] is posting 0 beds. No high school or eulogio psych. [phone]; Per call at 7:31am to [facility] at capacity with no expected change, however, can cb for update.   [facility] ([facility] is posting 0 beds. [phone];     [facility] () is posting 0 beds. [phone].   [facility] is posting 6 beds. Ages 18-35, labs required. No recent violence. [phone]; Pt not age appropriate.   [facility] ([facility]) is posting 0 beds. [phone];     [facility] ([facility]) is posting 0 beds. [phone];  Per Esthela @ 11:18 PM, [facility] is at capacity, can try calling back after 5PM.           Pt remains on work list pending appropriate bed availability.

## 2025-06-19 NOTE — PROGRESS NOTES
CLINICAL NUTRITION SERVICES    Reviewed nutrition risk factors due to LOS. No concerns with oral intake per RN or [employer] (room service meal ordering system). Reviewed wt hx. No unintentional wt loss. No indication of pressure injury.    Follow Up / Monitoring:   RD to sign off at this time. RD may be consulted if needs arise.     Gaby Mae MS, RDN, LD  TCU/OB/Ortho Clinical Dietitian  Available via phone and Vocera  Phone: [phone]  Vocera: 5 Ortho Clinical Dietitian   Weekend/Holiday Vocera: Weekend Holiday Clinical Dietitian [Multi Site Groups]

## 2025-06-19 NOTE — TELEPHONE ENCOUNTER
R: MN  Access Inpatient Bed Call Log  6/19/2025 12:37 AM  Intake has called facilities that have not updated their bed status within the last 12 hours.    Adults:    *[facility]:  [place] [facility]: @ [facility].  [facility][phone]: @ [facility]. Reporting no reviews overnight.    [facility]- [id number]: @ [facility]. Low acuity   [place] [facility]- [id number]: @ [facility]. Low acuity only   [place] -- [facility]- [id number]: @ [facility].   [facility]- [phone]: @ [facility].   [facility]/ beds- [id number]: @ POSTING 6 BEDS. Ages 18-35, Voluntary only, NO aggression/physical/sexual assault, violence hx or drug abuse, or psychosis. Negative Covid -12:38 AM Per Kaycee, YA: 3, Adol: can review, Child: 0.  [facility]- [phone]: @ [facility].  [facility]- [id number]: @ [facility].  [place] -- [facility]- [phone]: @ CAPACITY. Do not review overnight.     Pt remains on waitlist pending appropriate placement availability.

## 2025-06-19 NOTE — PLAN OF CARE
"Goal Outcome Evaluation:         VS: /44 (BP Location: Left arm)   Pulse 67   Temp 97.6  F (36.4  C) (Oral)   Resp 16   Ht 1.707 m (5' 7.2\")   Wt 89 kg (196 lb 3.4 oz)   SpO2 96%   BMI 30.55 kg/m     O2: Sating >90% on RA. Lung sounds clear. Denies chest pain and SOB.   Output: Voids spontaneously and adequately.    Last BM: 6/18/25. Bowel sounds active x4. Passing flatus.    Activity: Up with 1 assist, FWW, and gait belt.    Skin: Redness in groin folds, scab on R-calf.   Pain: Pain was managed with tylenol.    CMS: A&O x 3, disoriented to situation. Reports N/T in bilateral lower extremities. Pt requested Seroquel it was not available, PRN risperidone was given instead.  Increased the dosage of Seroquel.   Dressing: none   Diet: Regular. Appetite was good. Denies N/V.    LDA: None   Equipment: FWW, gait belt.    Plan: Waiting for in patient psych bed.    Additional Info: Pt expressed disappointment that she was not discharged home with  today, she was agitated and emotional, however she was easily redirectable. Pt was able to self soothe.                        "

## 2025-06-20 LAB
GLUCOSE BLDC GLUCOMTR-MCNC: 174 MG/DL (ref 70–99)
GLUCOSE BLDC GLUCOMTR-MCNC: 216 MG/DL (ref 70–99)
GLUCOSE BLDC GLUCOMTR-MCNC: 227 MG/DL (ref 70–99)
GLUCOSE BLDC GLUCOMTR-MCNC: 265 MG/DL (ref 70–99)
GLUCOSE BLDC GLUCOMTR-MCNC: 282 MG/DL (ref 70–99)

## 2025-06-20 PROCEDURE — 250N000013 HC RX MED GY IP 250 OP 250 PS 637: Performed by: INTERNAL MEDICINE

## 2025-06-20 PROCEDURE — 120N000002 HC R&B MED SURG/OB UMMC

## 2025-06-20 PROCEDURE — G0463 HOSPITAL OUTPT CLINIC VISIT: HCPCS | Mod: 25

## 2025-06-20 PROCEDURE — 250N000013 HC RX MED GY IP 250 OP 250 PS 637

## 2025-06-20 PROCEDURE — 99232 SBSQ HOSP IP/OBS MODERATE 35: CPT | Performed by: INTERNAL MEDICINE

## 2025-06-20 PROCEDURE — 97602 WOUND(S) CARE NON-SELECTIVE: CPT

## 2025-06-20 PROCEDURE — 99233 SBSQ HOSP IP/OBS HIGH 50: CPT | Performed by: STUDENT IN AN ORGANIZED HEALTH CARE EDUCATION/TRAINING PROGRAM

## 2025-06-20 PROCEDURE — 250N000013 HC RX MED GY IP 250 OP 250 PS 637: Performed by: STUDENT IN AN ORGANIZED HEALTH CARE EDUCATION/TRAINING PROGRAM

## 2025-06-20 RX ORDER — HYDROXYZINE HYDROCHLORIDE 25 MG/1
25 TABLET, FILM COATED ORAL ONCE
Status: COMPLETED | OUTPATIENT
Start: 2025-06-20 | End: 2025-06-20

## 2025-06-20 RX ORDER — CLONAZEPAM 0.5 MG/1
0.5 TABLET ORAL 2 TIMES DAILY
Status: DISCONTINUED | OUTPATIENT
Start: 2025-06-20 | End: 2025-06-24 | Stop reason: HOSPADM

## 2025-06-20 RX ORDER — HYDROXYZINE HYDROCHLORIDE 25 MG/1
25 TABLET, FILM COATED ORAL EVERY 6 HOURS PRN
Status: DISCONTINUED | OUTPATIENT
Start: 2025-06-20 | End: 2025-06-24 | Stop reason: HOSPADM

## 2025-06-20 RX ADMIN — RISPERIDONE 0.5 MG: 0.5 TABLET, FILM COATED ORAL at 10:45

## 2025-06-20 RX ADMIN — INSULIN ASPART 2 UNITS: 100 INJECTION, SOLUTION INTRAVENOUS; SUBCUTANEOUS at 21:06

## 2025-06-20 RX ADMIN — ACETAMINOPHEN 650 MG: 325 TABLET, FILM COATED ORAL at 18:49

## 2025-06-20 RX ADMIN — PANTOPRAZOLE SODIUM 40 MG: 40 TABLET, DELAYED RELEASE ORAL at 15:38

## 2025-06-20 RX ADMIN — Medication 1 CAPSULE: at 08:37

## 2025-06-20 RX ADMIN — QUETIAPINE FUMARATE 100 MG: 100 TABLET ORAL at 18:07

## 2025-06-20 RX ADMIN — QUETIAPINE FUMARATE 200 MG: 100 TABLET ORAL at 21:05

## 2025-06-20 RX ADMIN — METHOCARBAMOL 500 MG: 500 TABLET ORAL at 08:59

## 2025-06-20 RX ADMIN — FUROSEMIDE 20 MG: 20 TABLET ORAL at 08:35

## 2025-06-20 RX ADMIN — ROSUVASTATIN 20 MG: 20 TABLET, FILM COATED ORAL at 20:15

## 2025-06-20 RX ADMIN — ATENOLOL 25 MG: 25 TABLET ORAL at 20:15

## 2025-06-20 RX ADMIN — APIXABAN 2.5 MG: 2.5 TABLET, FILM COATED ORAL at 20:15

## 2025-06-20 RX ADMIN — HYDROXYZINE HYDROCHLORIDE 25 MG: 25 TABLET, FILM COATED ORAL at 19:33

## 2025-06-20 RX ADMIN — OLOPATADINE HYDROCHLORIDE 1 DROP: 1 SOLUTION OPHTHALMIC at 20:18

## 2025-06-20 RX ADMIN — LAMOTRIGINE 200 MG: 200 TABLET ORAL at 08:36

## 2025-06-20 RX ADMIN — PIMECROLIMUS: 10 CREAM TOPICAL at 20:20

## 2025-06-20 RX ADMIN — METHOCARBAMOL 500 MG: 500 TABLET ORAL at 15:38

## 2025-06-20 RX ADMIN — LIDOCAINE 4% 1 PATCH: 40 PATCH TOPICAL at 20:22

## 2025-06-20 RX ADMIN — PANTOPRAZOLE SODIUM 40 MG: 40 TABLET, DELAYED RELEASE ORAL at 08:36

## 2025-06-20 RX ADMIN — MONTELUKAST 10 MG: 10 TABLET, FILM COATED ORAL at 08:36

## 2025-06-20 RX ADMIN — ATENOLOL 25 MG: 25 TABLET ORAL at 08:36

## 2025-06-20 RX ADMIN — DICLOFENAC SODIUM TOPICAL GEL, 1% 4 G: 10 GEL TOPICAL at 20:19

## 2025-06-20 RX ADMIN — LAMOTRIGINE 200 MG: 200 TABLET ORAL at 20:15

## 2025-06-20 RX ADMIN — DICLOFENAC SODIUM TOPICAL GEL, 1% 4 G: 10 GEL TOPICAL at 16:19

## 2025-06-20 RX ADMIN — FUROSEMIDE 20 MG: 20 TABLET ORAL at 15:38

## 2025-06-20 RX ADMIN — INSULIN ASPART 1 UNITS: 100 INJECTION, SOLUTION INTRAVENOUS; SUBCUTANEOUS at 16:17

## 2025-06-20 RX ADMIN — CLONAZEPAM 0.5 MG: 0.5 TABLET ORAL at 20:15

## 2025-06-20 RX ADMIN — APIXABAN 2.5 MG: 2.5 TABLET, FILM COATED ORAL at 08:37

## 2025-06-20 RX ADMIN — DICLOFENAC SODIUM TOPICAL GEL, 1% 4 G: 10 GEL TOPICAL at 08:43

## 2025-06-20 RX ADMIN — POLYETHYLENE GLYCOL 3350 17 G: 17 POWDER, FOR SOLUTION ORAL at 08:36

## 2025-06-20 RX ADMIN — PIMECROLIMUS: 10 CREAM TOPICAL at 08:50

## 2025-06-20 RX ADMIN — Medication 100 MG: at 08:36

## 2025-06-20 RX ADMIN — INSULIN GLARGINE 13 UNITS: 100 INJECTION, SOLUTION SUBCUTANEOUS at 21:05

## 2025-06-20 RX ADMIN — Medication 25 MCG: at 08:36

## 2025-06-20 RX ADMIN — INSULIN ASPART 2 UNITS: 100 INJECTION, SOLUTION INTRAVENOUS; SUBCUTANEOUS at 08:47

## 2025-06-20 RX ADMIN — OLOPATADINE HYDROCHLORIDE 1 DROP: 1 SOLUTION OPHTHALMIC at 08:45

## 2025-06-20 RX ADMIN — INSULIN ASPART 3 UNITS: 100 INJECTION, SOLUTION INTRAVENOUS; SUBCUTANEOUS at 11:23

## 2025-06-20 RX ADMIN — HYDROXYZINE HYDROCHLORIDE 25 MG: 25 TABLET, FILM COATED ORAL at 02:56

## 2025-06-20 RX ADMIN — DICLOFENAC SODIUM TOPICAL GEL, 1% 4 G: 10 GEL TOPICAL at 11:24

## 2025-06-20 RX ADMIN — ACETAMINOPHEN 650 MG: 325 TABLET, FILM COATED ORAL at 10:06

## 2025-06-20 RX ADMIN — QUETIAPINE FUMARATE 100 MG: 100 TABLET ORAL at 08:36

## 2025-06-20 RX ADMIN — RISPERIDONE 0.5 MG: 0.5 TABLET, FILM COATED ORAL at 00:58

## 2025-06-20 ASSESSMENT — ACTIVITIES OF DAILY LIVING (ADL)
ADLS_ACUITY_SCORE: 42
ADLS_ACUITY_SCORE: 42
ADLS_ACUITY_SCORE: 47
ADLS_ACUITY_SCORE: 42
ADLS_ACUITY_SCORE: 47
ADLS_ACUITY_SCORE: 42
ADLS_ACUITY_SCORE: 42
ADLS_ACUITY_SCORE: 47
ADLS_ACUITY_SCORE: 47
ADLS_ACUITY_SCORE: 42
ADLS_ACUITY_SCORE: 47
ADLS_ACUITY_SCORE: 42

## 2025-06-20 NOTE — PROGRESS NOTES
Pt requesting med for anxiety. Pt states increased dose of Seroquel not helping. Pt denies chest pain. Medicine MD paged.

## 2025-06-20 NOTE — CONSULTS
WOC Consult    S: WOC Consult to evaluate and treat non-blanchable redness to bridge of nose.     B: Per Dr Amandeep Cutler on 6/19/2025: Patient is a 80 y/o woman who has a past medical history significant for bipolar disorder, borderline personality disorder, dependence personality disorder, OCD, anxiety, PTSD, insomnia, chronic pulmonary infiltrates, paroxysmal atrial fibrillation, sick sinus syndrome with pacemaker in place, hypertension, hyperlipidemia, chronic heart failure with preserved ejection fraction, COPD, asthma, past CVA, diabetes mellitus type II, GERD and chronic pain. Patient presented to the hospital on 11-Jun-2025 for acute psychosis.     A: Patient assessed. Per patient, she had multiple areas of basal cell carcinoma removed from nose which is why the area is pink. Patient does wear CPAP mask when sleeping but the mask does not touch this area.       6/20 Nose    R: No intervention needed. OK to keep open to air. WOC will sign off.    Shahrzad Aceves RN, CWOCN  Available via [email or web address]: [facility] WOC  Office *[phone]

## 2025-06-20 NOTE — PROVIDER NOTIFICATION
Pt reported chest tightness/pain mostly to the L side. Pt stated this often happens d/t anxiety. Hospitalist notified, report if chest pain reoccurs.

## 2025-06-20 NOTE — PROGRESS NOTES
[facility]    Medicine Progress Note - Hospitalist Service, GOLD TEAM 16    Date of Admission:  6/11/2025    Assessment & Plan   A: Patient is a 78 y/o woman who has a past medical history significant for bipolar disorder, borderline personality disorder, dependence personality disorder, OCD, anxiety, PTSD, insomnia, chronic pulmonary infiltrates, paroxysmal atrial fibrillation, sick sinus syndrome with pacemaker in place, hypertension, hyperlipidemia, chronic heart failure with preserved ejection fraction, COPD, asthma, past CVA, diabetes mellitus type II, GERD and chronic pain. Patient presented to the hospital on 11-Jun-2025 for acute psychosis.     Patient's  had called emergency medical services for acute psychotic behaviour and inability to care for patient at home. Patient had previously been hospitalized at [facility] from 16-May-2025 to 06-Jun-2025 for altered mental status and hypomania. Patient apparently was found in the seat of his car saying she needed to come to hospital for test; patient reportedly was exhibiting erratic behaviour.      P:  1.) Acute psychosis:  - Monitoring for safety.  - Patient has sitter at present.     2.) Bipolar disorder; borderline personality disorder; dependent personality disorder; OCD; anxiety; PTSD:  - Patient has been seen by Psychiatry.   - Patient currently on lamotrigine 200 mg twice daily, seroquel 100 mg twice daily, seroquel 200 mg at bedtime.   - Patient no longer on clomipramine.  - Patient on clonazepam 0.5 mg twice daily.  - Patient on hydroxyzine as needed.      3.) Covid-19 positive:  - Patient completed isolation period on 12-Jun-2025.     4.) Chronic pulmonary infiltrates with prior recurrent pneumonia, suspected to be duet to chronic aspiration from Zenker's diverticulum (previously repaired in 2017):  - Monitoring for evidence of infection.     5.) Suspected community-acquired pneumonia:  -  "Patient has completed a course of azithromycin.     6.) Paroxysmal atrial fibrillation:  - Patient on eliquis for anticoagulation.     7.) Sick sinus syndrome; patient has pacemaker in place:  - To f/u as outpatient with Cardiology as scheduled.     8.) Hypertension:  - Patient on atenolol.     9.) Chronic heart failure with preserved ejection fraction:  - Patient on lasix.  - Monitoring for evidence of acute heart failure.     10.) Hyperlipidemia:  - Patient on crestor.     11.) COPD, asthma:  - Patient on singulair.  - Albuterol as needed.     12.) Diabetes mellitus type II with long-term use of insulin:  - On 12-Jun-2025, HbA1c was 8.1%.  - Patient usually on trulicity 3 mg weekly and toujeo 16 units every evening as outpatient..  - Patient currently on lantus 13 units every evening and insulin sliding scale.      13.) GERD:  - Patient on PPI.     14.) Chronic pain:  - Patient sees a Pain Clinic as outpatient. Patient previously had failed oxycodone and hydromorphone. Patient reportedly was to be started on butrans but this was not reflected on patient's home medication list.  - Monitoring for symptoms.  - To f/u with Pain Clinic as outpatient.     15.) History of gastroparesis:  - Gastric emptying study reportedly showed moderate to severe gastroparesis likely worsened by trulicity. Trulicity dose had been decreased as outpatient on 25-Apr-2025. Trulicity now on hold.      16.) History of CVA:  - Patient on eliquis for anticoagulation for atrial fibrillation.  - Patient is on crestor for hyperlipidemia.           Diet: Regular Diet Adult    Lubin Catheter: Not present  Lines: None     Cardiac Monitoring: None  Code Status: Full Code      Clinically Significant Risk Factors                             # DMII: A1C = 8.1 % (Ref range: <5.7 %) within past 6 months   # Obesity: Estimated body mass index is 32.16 kg/m  as calculated from the following:    Height as of this encounter: 1.707 m (5' 7.2\").    Weight as of " this encounter: 93.7 kg (206 lb 9.1 oz).      # Financial/Environmental Concerns: none         Social Drivers of Health            Disposition Plan     Medically Ready for Discharge: Anticipated in 2-4 Days             Amandeep Cutler MD  Hospitalist Service, GOLD TEAM 16  [facility]  Securely message with [employer] (more info)  Text page via [facility] Paging/Directory   See signed in provider for up to date coverage information  ______________________________________________________________________    Interval History   Patient noted anxiety today. Patient noted no other problems.     Physical Exam   Vital Signs: Temp: 98.2  F (36.8  C) Temp src: Oral BP: (!) 140/44 Pulse: 72   Resp: 24 SpO2: 96 % O2 Device: None (Room air)    Weight: 206 lbs 9.14 oz    General: Patient comfortable, NAD.

## 2025-06-20 NOTE — PLAN OF CARE
"Goal Outcome Evaluation:      Plan of Care Reviewed With: patient        VS: BP (!) 140/44 (BP Location: Left arm)   Pulse 72   Temp 98.2  F (36.8  C) (Oral)   Resp 24   Ht 1.707 m (5' 7.2\")   Wt 93.7 kg (206 lb 9.1 oz)   SpO2 96%   BMI 32.16 kg/m     O2: Sating >90% on RA. Lung sounds clear. Denies chest pain and SOB.   Output: Voids spontaneously and adequately.    Last BM: 6/19/25. Bowel sounds active x4. Passing flatus.    Activity: Up with 1 assist, FWW, and gait belt.    Skin: Tenderness and redness over nosebridge. Scab on R-calf.   Pain: Pain was managed with tylenol.    CMS: A&Ox4. Reports N/T in bilateral lower extremities.    Dressing: None   Diet: Regular. Appetite was good. Denies N/V.    LDA: None   Equipment: FWW, gait belt, and personal belongings.    Plan: Discharge pending. Call light within reach and utilized appropriately.    Additional Info:                   "

## 2025-06-20 NOTE — CONSULTS
"      Psychiatry Consultation; Follow up              Reason for Consult, requesting source:    Follow up  Requesting source: David Leon    Labs and imaging reviewed              Interim history:      - Per note the patient had some anxiety and agitation this morning, however no acute events.    -On interview it remains manic and occasionally tangential.  However less irritated than yesterday.  -On interview today she says she feels \"better\".  Says she will do whatever I recommend in order to get her out of the hospital faster.  She does say this includes going to inpatient psychiatry including anywhere in the [facility]  - She is very upset with her  saying every time he calls to talk with her it makes her worse.  Says today she ended up hanging up on him because she felt herself becoming more agitated.  - Denies any SI, HI, AH, VH.        Current Medications:     Current Facility-Administered Medications   Medication Dose Route Frequency Provider Last Rate Last Admin    apixaban ANTICOAGULANT (ELIQUIS) tablet 2.5 mg  2.5 mg Oral BID Ashley Santos NP   2.5 mg at 06/20/25 0837    atenolol (TENORMIN) tablet 25 mg  25 mg Oral BID Ashley Santos NP   25 mg at 06/20/25 0836    clonazePAM (klonoPIN) half-tab 0.5 mg  0.5 mg Oral BID Kristopher Dorsey MD        diclofenac (VOLTAREN) 1 % topical gel 4 g  4 g Topical 4x Daily Ashley Santos NP   4 g at 06/20/25 1619    furosemide (LASIX) tablet 20 mg  20 mg Oral BID Ashley Santos NP   20 mg at 06/20/25 1538    insulin aspart (NovoLOG) injection (RAPID ACTING)  1-7 Units Subcutaneous TID AC Ashley Santos NP   1 Units at 06/20/25 1617    insulin aspart (NovoLOG) injection (RAPID ACTING)  1-5 Units Subcutaneous At Bedtime Ashley Santos NP   2 Units at 06/19/25 2149    insulin glargine (LANTUS PEN) injection 13 Units  13 Units Subcutaneous QPM David Leon MD   13 Units at 06/19/25 2142    lamoTRIgine " (LaMICtal) tablet 200 mg  200 mg Oral BID Rachael Rowland PA-C   200 mg at 06/20/25 0836    Lidocaine (LIDOCARE) 4 % Patch 1 patch  1 patch Transdermal Q24h Amandeep Cutler MD   1 patch at 06/19/25 2037    montelukast (SINGULAIR) tablet 10 mg  10 mg Oral Daily Ashley Santos NP   10 mg at 06/20/25 0836    olopatadine (PATANOL) 0.1 % ophthalmic solution 1 drop  1 drop Both Eyes BID Ashley Santos NP   1 drop at 06/20/25 0845    pantoprazole (PROTONIX) EC tablet 40 mg  40 mg Oral BID AC David Leon MD   40 mg at 06/20/25 1538    pimecrolimus (ELIDEL) 1 % cream   Topical BID Ashley Santos NP   Given at 06/20/25 0850    polyethylene glycol (MIRALAX) Packet 17 g  17 g Oral Daily Ashley Santos NP   17 g at 06/20/25 0836    psyllium (METAMUCIL/KONSYL) capsule 1 capsule  1 capsule Oral Daily Ashley Santos NP   1 capsule at 06/20/25 0837    pyridOXINE (VITAMIN B6) tablet 100 mg  100 mg Oral Daily Ashley Santos NP   100 mg at 06/20/25 0836    QUEtiapine (SEROquel) tablet 100 mg  100 mg Oral BID Erika Cloud MD   100 mg at 06/20/25 0836    QUEtiapine (SEROquel) tablet 200 mg  200 mg Oral At Bedtime Erika Cloud MD   200 mg at 06/19/25 2142    rosuvastatin (CRESTOR) tablet 20 mg  20 mg Oral QPM Ashley Santos NP   20 mg at 06/19/25 2037    Vitamin D3 (CHOLECALCIFEROL) tablet 25 mcg  25 mcg Oral Daily Ashley Santos NP   25 mcg at 06/20/25 0836              MSE:   Appearance: awake, alert  Attitude:  cooperative  Eye Contact:  fair  Mood:  anxious and better  Affect:  mood congruent, intensity is heightened, and labile  Speech:  clear, coherent and rambling  Psychomotor Behavior:  no evidence of tardive dyskinesia, dystonia, or tics  Muscle strength and tone: normal  Thought Process:  disorganized and tangental  Associations:  no loose associations  Thought Content:  no evidence of suicidal ideation or homicidal ideation  Insight:   "limited  Judgement:  limited  Oriented to:  time, person, and place  Attention Span and Concentration:  fair  Recent and Remote Memory:  fair    Vital signs:  Temp: 98.2  F (36.8  C) Temp src: Oral BP: (!) 140/44 Pulse: 72   Resp: 24 SpO2: 96 % O2 Device: None (Room air)   Height: 170.7 cm (5' 7.2\") Weight: 93.7 kg (206 lb 9.1 oz)  Estimated body mass index is 32.16 kg/m  as calculated from the following:    Height as of this encounter: 1.707 m (5' 7.2\").    Weight as of this encounter: 93.7 kg (206 lb 9.1 oz).             DSM-5 Diagnosis:   Bipolar 2 disorder (current shalonda possibly 2/2 Covid and mirtazapine)  OCD  Panic disorder  PTSD  Insomnia              Assessment:   79-year-old female with a history of mental health issues including bipolar disorder, history of paroxysmal A-fib with previous CVA and chronic anticoagulation, as well as COPD and CHF, who presents to the emergency department by ambulance after the patient was found in the seat of the 's car wanting to get some tests at the hospital but with obvious psychotic behavior.  Presentation thus far has been consistent with shalonda, which appears to be her first episode.  Contributing factors could include COVID and [facility].    Today Dominique still presents with mildly disorganized behavior and mood lability. He is still pending inpt psychiatry bed. Currently there is no evidence of cognitive deficiency, but still lingering shalonda. She is not holdable at this moment and she does not present a risk for herself or others. But if she becomes more agitated, intrusive, or tries to leave AMA put on a 72 hh.     Yesterday we increased Seroquel up to 400 mg daily.  For today we will add a daytime dose of Klonopin.  Discussed that if she is doing better on Monday may be able to discharge home.          Summary of Recommendations:   Legal: voluntary  Safety: 1:1  Labs/Studies: none  Medications:  --Klonopin 0.5mg " BID (change 6/20)  --Seroquel 100 mg BID in the morning and 200 mg at bedtime.  --Lamictal 200mg BID  -- Would need assessment for 72hh if tries to leave AMA     Follow up: Monday      Kristopher Dorsey MD    Department of Psychiatry  Please Vocruiz if questions    Total time spent in chart review, patient interview and coordination of care; 55 minutes - all time was spent on the date of the encounter that I saw patient

## 2025-06-20 NOTE — PLAN OF CARE
"Goal Outcome Evaluation:           Overall Patient Progress: no changeOverall Patient Progress: no change         VS: /60 (BP Location: Right arm)   Pulse 79   Temp 96.8  F (36  C) (Axillary)   Resp 20   Ht 1.707 m (5' 7.2\")   Wt 93.7 kg (206 lb 9.1 oz)   SpO2 97%   BMI 32.16 kg/m      O2: Room air, denies SOB, denies chest pain   Intermittent cough   Output: Voiding spontaneously    Last BM: 6/19   Activity: SBA with GB, walker   Skin: Scab on R calf. Blanchable redness bilateral breast folds and groin folds.  Nonblanchable redness nose; Medicine MD aware. WOC evaluated and stated area was a scar     Pain: Pain managed with PRN meds   CMS: Alert &Ox3; disoriented to situation  Numbness and tingling bilateral feet; baseline   Dressing: None   Diet: Regular diet    LDA: No IV access   Equipment: Personal belongings   Plan: Transfer to inpatient psych once bed becomes available    Additional Info: 1:1 bedside attendant for safety.    Pt frequently requesting Seroquel.                   "

## 2025-06-20 NOTE — PLAN OF CARE
Goal Outcome Evaluation:      Plan of Care Reviewed With: patient    Overall Patient Progress: no changeOverall Patient Progress: no change       VS: Temp: 98.7  F (37.1  C) Temp src: Oral BP: 136/70 Pulse: 73   Resp: 16 SpO2: 97 % O2 Device: None (Room air)     O2: Room air, denies SOB, intermittent chest pain pt states is d/t anxiety - provider notified. Notify provider if chest pain reoccurs.    Output: Voiding spontaneously    Last BM: 6/18/25   Activity: SBA with GB, walker   Skin: Scab on R calf and redness on groin folds   Pain: Intermittent chest pain, provider notified.    CMS: Alert, intermittent confusion/forgetfulness   Dressing: None   Diet: Regular diet    LDA: No IV access   Equipment: Personal belongings   Plan: Transfer to inpatient psych once bed becomes available    Additional Info: 1:1 bedside attendant for safety.  Pt frequently requesting Seroquel.  Pt reported anxiety and difficulty sleeping, provider notified and one time dose of Atarax given.

## 2025-06-21 ENCOUNTER — TELEPHONE (OUTPATIENT)
Dept: BEHAVIORAL HEALTH | Facility: CLINIC | Age: 80
End: 2025-06-21
Payer: COMMERCIAL

## 2025-06-21 LAB
GLUCOSE BLDC GLUCOMTR-MCNC: 183 MG/DL (ref 70–99)
GLUCOSE BLDC GLUCOMTR-MCNC: 203 MG/DL (ref 70–99)
GLUCOSE BLDC GLUCOMTR-MCNC: 207 MG/DL (ref 70–99)
GLUCOSE BLDC GLUCOMTR-MCNC: 276 MG/DL (ref 70–99)

## 2025-06-21 PROCEDURE — 250N000013 HC RX MED GY IP 250 OP 250 PS 637

## 2025-06-21 PROCEDURE — 250N000013 HC RX MED GY IP 250 OP 250 PS 637: Performed by: INTERNAL MEDICINE

## 2025-06-21 PROCEDURE — 99232 SBSQ HOSP IP/OBS MODERATE 35: CPT | Performed by: INTERNAL MEDICINE

## 2025-06-21 PROCEDURE — 120N000002 HC R&B MED SURG/OB UMMC

## 2025-06-21 PROCEDURE — 250N000013 HC RX MED GY IP 250 OP 250 PS 637: Performed by: STUDENT IN AN ORGANIZED HEALTH CARE EDUCATION/TRAINING PROGRAM

## 2025-06-21 RX ADMIN — PANTOPRAZOLE SODIUM 40 MG: 40 TABLET, DELAYED RELEASE ORAL at 18:04

## 2025-06-21 RX ADMIN — ROSUVASTATIN 20 MG: 20 TABLET, FILM COATED ORAL at 19:44

## 2025-06-21 RX ADMIN — INSULIN ASPART 2 UNITS: 100 INJECTION, SOLUTION INTRAVENOUS; SUBCUTANEOUS at 12:51

## 2025-06-21 RX ADMIN — APIXABAN 2.5 MG: 2.5 TABLET, FILM COATED ORAL at 10:05

## 2025-06-21 RX ADMIN — DICLOFENAC SODIUM TOPICAL GEL, 1% 4 G: 10 GEL TOPICAL at 19:48

## 2025-06-21 RX ADMIN — LAMOTRIGINE 200 MG: 200 TABLET ORAL at 19:44

## 2025-06-21 RX ADMIN — OLOPATADINE HYDROCHLORIDE 1 DROP: 1 SOLUTION OPHTHALMIC at 19:48

## 2025-06-21 RX ADMIN — METHOCARBAMOL 500 MG: 500 TABLET ORAL at 15:14

## 2025-06-21 RX ADMIN — QUETIAPINE FUMARATE 200 MG: 100 TABLET ORAL at 21:21

## 2025-06-21 RX ADMIN — APIXABAN 2.5 MG: 2.5 TABLET, FILM COATED ORAL at 19:44

## 2025-06-21 RX ADMIN — OLOPATADINE HYDROCHLORIDE 1 DROP: 1 SOLUTION OPHTHALMIC at 09:55

## 2025-06-21 RX ADMIN — CLONAZEPAM 0.5 MG: 0.5 TABLET ORAL at 10:09

## 2025-06-21 RX ADMIN — Medication 25 MCG: at 09:57

## 2025-06-21 RX ADMIN — INSULIN ASPART 2 UNITS: 100 INJECTION, SOLUTION INTRAVENOUS; SUBCUTANEOUS at 09:55

## 2025-06-21 RX ADMIN — RISPERIDONE 0.5 MG: 0.5 TABLET, FILM COATED ORAL at 00:22

## 2025-06-21 RX ADMIN — INSULIN GLARGINE 13 UNITS: 100 INJECTION, SOLUTION SUBCUTANEOUS at 19:48

## 2025-06-21 RX ADMIN — FUROSEMIDE 20 MG: 20 TABLET ORAL at 15:14

## 2025-06-21 RX ADMIN — QUETIAPINE FUMARATE 100 MG: 100 TABLET ORAL at 18:05

## 2025-06-21 RX ADMIN — HYDROXYZINE HYDROCHLORIDE 25 MG: 25 TABLET, FILM COATED ORAL at 15:14

## 2025-06-21 RX ADMIN — Medication 100 MG: at 10:04

## 2025-06-21 RX ADMIN — ATENOLOL 25 MG: 25 TABLET ORAL at 09:57

## 2025-06-21 RX ADMIN — Medication 1 CAPSULE: at 09:57

## 2025-06-21 RX ADMIN — PIMECROLIMUS: 10 CREAM TOPICAL at 09:55

## 2025-06-21 RX ADMIN — RISPERIDONE 0.5 MG: 0.5 TABLET, FILM COATED ORAL at 13:01

## 2025-06-21 RX ADMIN — DICLOFENAC SODIUM TOPICAL GEL, 1% 4 G: 10 GEL TOPICAL at 12:51

## 2025-06-21 RX ADMIN — PIMECROLIMUS: 10 CREAM TOPICAL at 19:48

## 2025-06-21 RX ADMIN — DICLOFENAC SODIUM TOPICAL GEL, 1% 4 G: 10 GEL TOPICAL at 15:14

## 2025-06-21 RX ADMIN — ACETAMINOPHEN 650 MG: 325 TABLET, FILM COATED ORAL at 23:36

## 2025-06-21 RX ADMIN — ACETAMINOPHEN 650 MG: 325 TABLET, FILM COATED ORAL at 15:14

## 2025-06-21 RX ADMIN — HYDROXYZINE HYDROCHLORIDE 25 MG: 25 TABLET, FILM COATED ORAL at 23:36

## 2025-06-21 RX ADMIN — ACETAMINOPHEN 650 MG: 325 TABLET, FILM COATED ORAL at 19:44

## 2025-06-21 RX ADMIN — FUROSEMIDE 20 MG: 20 TABLET ORAL at 09:56

## 2025-06-21 RX ADMIN — DICLOFENAC SODIUM TOPICAL GEL, 1% 4 G: 10 GEL TOPICAL at 09:55

## 2025-06-21 RX ADMIN — LIDOCAINE 4% 1 PATCH: 40 PATCH TOPICAL at 19:43

## 2025-06-21 RX ADMIN — ACETAMINOPHEN 650 MG: 325 TABLET, FILM COATED ORAL at 10:04

## 2025-06-21 RX ADMIN — INSULIN ASPART 2 UNITS: 100 INJECTION, SOLUTION INTRAVENOUS; SUBCUTANEOUS at 21:21

## 2025-06-21 RX ADMIN — INSULIN ASPART 1 UNITS: 100 INJECTION, SOLUTION INTRAVENOUS; SUBCUTANEOUS at 18:05

## 2025-06-21 RX ADMIN — QUETIAPINE FUMARATE 100 MG: 100 TABLET ORAL at 09:57

## 2025-06-21 RX ADMIN — CLONAZEPAM 0.5 MG: 0.5 TABLET ORAL at 19:44

## 2025-06-21 RX ADMIN — PANTOPRAZOLE SODIUM 40 MG: 40 TABLET, DELAYED RELEASE ORAL at 09:57

## 2025-06-21 RX ADMIN — ATENOLOL 25 MG: 25 TABLET ORAL at 19:44

## 2025-06-21 RX ADMIN — MONTELUKAST 10 MG: 10 TABLET, FILM COATED ORAL at 09:57

## 2025-06-21 RX ADMIN — LAMOTRIGINE 200 MG: 200 TABLET ORAL at 09:57

## 2025-06-21 RX ADMIN — Medication 1 MG: at 00:21

## 2025-06-21 RX ADMIN — POLYETHYLENE GLYCOL 3350 17 G: 17 POWDER, FOR SOLUTION ORAL at 09:56

## 2025-06-21 ASSESSMENT — ACTIVITIES OF DAILY LIVING (ADL)
ADLS_ACUITY_SCORE: 47

## 2025-06-21 NOTE — TELEPHONE ENCOUNTER
R: [place]  Access Inpatient Adult Bed Call Log  6/21/25 @ 1:00am   Intake has called facilities that have not updated their bed status within the last 12 hours.     *METRO:  [place] -- [facility]: @ capacity.  [facility]: @ cap per website. No reviews overnight. Review 8am-10pm #[phone]  [facility] ([facility]): @ cap per website. Low acuity. #[phone]  [place] -- [facility]: @ cap per website. Low acuity only. #[phone]  [facility] ([facility]): @ cap per website. #[phone]  [facility]: @ cap per website. #[phone]  [facility]/ beds: POSTING 6 BEDS - Per Viri only 1 available. Ages 18-35, NO aggression/physical or sexual assault/violence hx, or drug abuse. #[phone]  [facility] ([facility]):  POSTING 1 BED. #[phone]  [facility] -- [facility]: @ cap per website. #[phone]  [facility] ([facility]): @ cap per website. No reviews overnight. #[phone]     Pt remains on waitlist pending appropriate placement availability.

## 2025-06-21 NOTE — TELEPHONE ENCOUNTER
S: Pt is voluntary and wants [facility] only.  R: [place]  Access Inpatient Bed Call Log 6/21/25 at 8:15 AM: Intake has called facilities that have not updated the bed status within the last 12 hours.                        [facility] is at capacity.           [facility] is posting 0 beds. [phone] 8:48 AM, at capacity  [facility] is posting 0 beds. Negative covid required.  [facility] is posting 0 beds. Neg covid. No high school/Leah-psych. [phone]  [place] is posting 0 beds. [phone]  [facility] is posting 0 beds. [phone]  [facility] is posting 2 beds. Negative covid. [phone] Per call 8:30 AM, no answer  Young Adult beds.  [facility] ([facility] is posting 0 beds [phone].    Continue to seek placement.

## 2025-06-21 NOTE — TELEPHONE ENCOUNTER
R: [place]  Access Inpatient Bed Call Log 6/20/25 at 5:00 PM: Intake has called facilities that have not updated the bed status within the last 12 hours.                               [facility] is at capacity.           [facility] is posting 0 beds. [phone] Per call 6:39 AM  [facility] is posting 0 beds. Negative covid required.  [facility] is posting 0 beds. Neg covid. No high school/Leah-psych. [phone] Per call 9:34am   [facility] is posting 0 beds. [phone]  [facility] is posting 0 beds. [phone]   [facility] is posting 2 beds. Negative covid. [phone] Per call 8:53am, currently reviewing for both  [facility] ([facility] is posting 0 beds [phone].

## 2025-06-21 NOTE — PROGRESS NOTES
A/Ox's 3. 1:1 sitter at bedside for safety. Pt denied pain. CMS to baseline. Tolerated regular diet. Denied any nausea, CP, SOB, lightheadedness or dizziness. Voiding without pain or difficulty. Passing flatus. Up with SBA. Resting in bed at this time with call light in reach. Able to make needs known.

## 2025-06-21 NOTE — PLAN OF CARE
"Goal Outcome Evaluation:      Plan of Care Reviewed With: patient        VS: BP (!) 141/59 (BP Location: Right arm)   Pulse 60   Temp 98.6  F (37  C) (Oral)   Resp 18   Ht 1.707 m (5' 7.2\")   Wt 93.7 kg (206 lb 9.1 oz)   SpO2 96%   BMI 32.16 kg/m     O2: Sating >90% on RA. Lung sounds clear. Denies chest pain and SOB.   Output: Voids spontaneously and adequately.    Last BM: 6/21/25. Bowel sounds active x4. Passing flatus.    Activity: Up with 1 assist, FWW, and gait belt.    Skin: Tenderness and redness over nosebridge. Scab on R-calf.    Pain: Pain was managed with Tylenol.    CMS: A&Ox4. Denies N/T.    Dressing: None   Diet: Regular. Appetite was good. Denies N/V.    LDA: None    Equipment: FWW, gait belt, and personal belongings.    Plan: Discharge pending Call light within reach and utilized appropriately.    Additional Info:                   "

## 2025-06-21 NOTE — PROGRESS NOTES
[facility]    Medicine Progress Note - Hospitalist Service, GOLD TEAM 16    Date of Admission:  6/11/2025    Assessment & Plan   A: Patient is a 78 y/o woman who has a past medical history significant for bipolar disorder, borderline personality disorder, dependence personality disorder, OCD, anxiety, PTSD, insomnia, chronic pulmonary infiltrates, paroxysmal atrial fibrillation, sick sinus syndrome with pacemaker in place, hypertension, hyperlipidemia, chronic heart failure with preserved ejection fraction, COPD, asthma, past CVA, diabetes mellitus type II, GERD and chronic pain. Patient presented to the hospital on 11-Jun-2025 for acute psychosis.     Patient's  had called emergency medical services for acute psychotic behaviour and inability to care for patient at home. Patient had previously been hospitalized at [facility] from 16-May-2025 to 06-Jun-2025 for altered mental status and hypomania. Patient apparently was found in the seat of his car saying she needed to come to hospital for test; patient reportedly was exhibiting erratic behaviour.      P:  1.) Acute psychosis:  - Monitoring for safety.  - Patient has sitter at present.     2.) Bipolar disorder; borderline personality disorder; dependent personality disorder; OCD; anxiety; PTSD:  - Patient has been seen by Psychiatry.   - Patient currently on lamotrigine 200 mg twice daily, seroquel 100 mg twice daily, seroquel 200 mg at bedtime.   - Patient no longer on clomipramine.  - Patient on clonazepam 0.5 mg twice daily.  - Patient on hydroxyzine as needed.      3.) Covid-19 positive:  - Patient completed isolation period on 12-Jun-2025.     4.) Chronic pulmonary infiltrates with prior recurrent pneumonia, suspected to be duet to chronic aspiration from Zenker's diverticulum (previously repaired in 2017):  - Monitoring for evidence of infection.     5.) Suspected community-acquired pneumonia:  -  "Patient has completed a course of azithromycin.     6.) Paroxysmal atrial fibrillation:  - Patient on eliquis for anticoagulation.     7.) Sick sinus syndrome; patient has pacemaker in place:  - To f/u as outpatient with Cardiology as scheduled.     8.) Hypertension:  - Patient on atenolol.     9.) Chronic heart failure with preserved ejection fraction:  - Patient on lasix.  - Monitoring for evidence of acute heart failure.     10.) Hyperlipidemia:  - Patient on crestor.     11.) COPD, asthma:  - Patient on singulair.  - Albuterol as needed.     12.) Diabetes mellitus type II with long-term use of insulin:  - On 12-Jun-2025, HbA1c was 8.1%.  - Patient usually on trulicity 3 mg weekly and toujeo 16 units every evening as outpatient..  - Patient currently on lantus 13 units every evening and insulin sliding scale.      13.) GERD:  - Patient on PPI.     14.) Chronic pain:  - Patient sees a Pain Clinic as outpatient. Patient previously had failed oxycodone and hydromorphone. Patient reportedly was to be started on butrans but this was not reflected on patient's home medication list.  - Monitoring for symptoms.  - To f/u with Pain Clinic as outpatient.     15.) History of gastroparesis:  - Gastric emptying study reportedly showed moderate to severe gastroparesis likely worsened by trulicity. Trulicity dose had been decreased as outpatient on 25-Apr-2025. Trulicity now on hold.      16.) History of CVA:  - Patient on eliquis for anticoagulation for atrial fibrillation.  - Patient is on crestor for hyperlipidemia.           Diet: Regular Diet Adult    Lubin Catheter: Not present  Lines: None     Cardiac Monitoring: None  Code Status: Full Code      Clinically Significant Risk Factors                             # DMII: A1C = 8.1 % (Ref range: <5.7 %) within past 6 months   # Obesity: Estimated body mass index is 32.16 kg/m  as calculated from the following:    Height as of this encounter: 1.707 m (5' 7.2\").    Weight as of " this encounter: 93.7 kg (206 lb 9.1 oz).      # Financial/Environmental Concerns: none         Social Drivers of Health            Disposition Plan     Medically Ready for Discharge: Anticipated in 2-4 Days             Amandeep Cutler MD  Hospitalist Service, GOLD TEAM 16  [facility]  Securely message with [employer] (more info)  Text page via [facility] Paging/Directory   See signed in provider for up to date coverage information  ______________________________________________________________________    Interval History   Patient noted anxiety improved today. Patient noted no new problems.     Physical Exam   Vital Signs: Temp: 98.6  F (37  C) Temp src: Oral BP: (!) 141/59 Pulse: 60   Resp: 18 SpO2: 96 % O2 Device: None (Room air)    Weight: 206 lbs 9.14 oz    General: Patient comfortable, NAD.

## 2025-06-22 ENCOUNTER — TELEPHONE (OUTPATIENT)
Dept: BEHAVIORAL HEALTH | Facility: CLINIC | Age: 80
End: 2025-06-22
Payer: COMMERCIAL

## 2025-06-22 LAB
ALBUMIN UR-MCNC: NEGATIVE MG/DL
APPEARANCE UR: CLEAR
BILIRUB UR QL STRIP: NEGATIVE
COLOR UR AUTO: ABNORMAL
GLUCOSE BLDC GLUCOMTR-MCNC: 183 MG/DL (ref 70–99)
GLUCOSE BLDC GLUCOMTR-MCNC: 185 MG/DL (ref 70–99)
GLUCOSE BLDC GLUCOMTR-MCNC: 194 MG/DL (ref 70–99)
GLUCOSE BLDC GLUCOMTR-MCNC: 209 MG/DL (ref 70–99)
GLUCOSE BLDC GLUCOMTR-MCNC: 241 MG/DL (ref 70–99)
GLUCOSE BLDC GLUCOMTR-MCNC: 259 MG/DL (ref 70–99)
GLUCOSE UR STRIP-MCNC: NEGATIVE MG/DL
HGB UR QL STRIP: ABNORMAL
HYALINE CASTS: 3 /LPF
KETONES UR STRIP-MCNC: NEGATIVE MG/DL
LEUKOCYTE ESTERASE UR QL STRIP: ABNORMAL
MUCOUS THREADS #/AREA URNS LPF: PRESENT /LPF
NITRATE UR QL: NEGATIVE
PH UR STRIP: 5 [PH] (ref 5–7)
RBC URINE: 0 /HPF
SP GR UR STRIP: 1.01 (ref 1–1.03)
UROBILINOGEN UR STRIP-MCNC: NORMAL MG/DL
WBC URINE: 2 /HPF

## 2025-06-22 PROCEDURE — 250N000013 HC RX MED GY IP 250 OP 250 PS 637: Performed by: INTERNAL MEDICINE

## 2025-06-22 PROCEDURE — 250N000013 HC RX MED GY IP 250 OP 250 PS 637: Performed by: STUDENT IN AN ORGANIZED HEALTH CARE EDUCATION/TRAINING PROGRAM

## 2025-06-22 PROCEDURE — 120N000002 HC R&B MED SURG/OB UMMC

## 2025-06-22 PROCEDURE — 250N000013 HC RX MED GY IP 250 OP 250 PS 637

## 2025-06-22 PROCEDURE — 81001 URINALYSIS AUTO W/SCOPE: CPT | Performed by: INTERNAL MEDICINE

## 2025-06-22 PROCEDURE — 99232 SBSQ HOSP IP/OBS MODERATE 35: CPT | Performed by: INTERNAL MEDICINE

## 2025-06-22 RX ADMIN — ACETAMINOPHEN 650 MG: 325 TABLET, FILM COATED ORAL at 11:48

## 2025-06-22 RX ADMIN — Medication 1 CAPSULE: at 08:10

## 2025-06-22 RX ADMIN — CLONAZEPAM 0.5 MG: 0.5 TABLET ORAL at 19:41

## 2025-06-22 RX ADMIN — Medication 1 MG: at 00:54

## 2025-06-22 RX ADMIN — APIXABAN 2.5 MG: 2.5 TABLET, FILM COATED ORAL at 19:41

## 2025-06-22 RX ADMIN — METHOCARBAMOL 500 MG: 500 TABLET ORAL at 11:49

## 2025-06-22 RX ADMIN — Medication 25 MCG: at 08:11

## 2025-06-22 RX ADMIN — OLOPATADINE HYDROCHLORIDE 1 DROP: 1 SOLUTION OPHTHALMIC at 19:41

## 2025-06-22 RX ADMIN — LAMOTRIGINE 200 MG: 200 TABLET ORAL at 19:41

## 2025-06-22 RX ADMIN — LIDOCAINE 4% 1 PATCH: 40 PATCH TOPICAL at 19:41

## 2025-06-22 RX ADMIN — ACETAMINOPHEN 650 MG: 325 TABLET, FILM COATED ORAL at 05:23

## 2025-06-22 RX ADMIN — INSULIN ASPART 3 UNITS: 100 INJECTION, SOLUTION INTRAVENOUS; SUBCUTANEOUS at 17:03

## 2025-06-22 RX ADMIN — OLOPATADINE HYDROCHLORIDE 1 DROP: 1 SOLUTION OPHTHALMIC at 08:16

## 2025-06-22 RX ADMIN — HYDROXYZINE HYDROCHLORIDE 25 MG: 25 TABLET, FILM COATED ORAL at 15:34

## 2025-06-22 RX ADMIN — MONTELUKAST 10 MG: 10 TABLET, FILM COATED ORAL at 08:11

## 2025-06-22 RX ADMIN — PANTOPRAZOLE SODIUM 40 MG: 40 TABLET, DELAYED RELEASE ORAL at 15:34

## 2025-06-22 RX ADMIN — QUETIAPINE FUMARATE 200 MG: 100 TABLET ORAL at 21:03

## 2025-06-22 RX ADMIN — DICLOFENAC SODIUM TOPICAL GEL, 1% 4 G: 10 GEL TOPICAL at 08:13

## 2025-06-22 RX ADMIN — QUETIAPINE FUMARATE 100 MG: 100 TABLET ORAL at 08:11

## 2025-06-22 RX ADMIN — ATENOLOL 25 MG: 25 TABLET ORAL at 08:11

## 2025-06-22 RX ADMIN — ACETAMINOPHEN 650 MG: 325 TABLET, FILM COATED ORAL at 19:40

## 2025-06-22 RX ADMIN — INSULIN ASPART 2 UNITS: 100 INJECTION, SOLUTION INTRAVENOUS; SUBCUTANEOUS at 11:49

## 2025-06-22 RX ADMIN — Medication 1 MG: at 23:21

## 2025-06-22 RX ADMIN — ACETAMINOPHEN 650 MG: 325 TABLET, FILM COATED ORAL at 15:34

## 2025-06-22 RX ADMIN — RISPERIDONE 0.5 MG: 0.5 TABLET, FILM COATED ORAL at 00:54

## 2025-06-22 RX ADMIN — FUROSEMIDE 20 MG: 20 TABLET ORAL at 08:11

## 2025-06-22 RX ADMIN — INSULIN GLARGINE 13 UNITS: 100 INJECTION, SOLUTION SUBCUTANEOUS at 19:41

## 2025-06-22 RX ADMIN — HYDROXYZINE HYDROCHLORIDE 25 MG: 25 TABLET, FILM COATED ORAL at 22:09

## 2025-06-22 RX ADMIN — ROSUVASTATIN 20 MG: 20 TABLET, FILM COATED ORAL at 19:41

## 2025-06-22 RX ADMIN — RISPERIDONE 0.5 MG: 0.5 TABLET, FILM COATED ORAL at 14:20

## 2025-06-22 RX ADMIN — Medication 100 MG: at 08:10

## 2025-06-22 RX ADMIN — PIMECROLIMUS: 10 CREAM TOPICAL at 08:16

## 2025-06-22 RX ADMIN — INSULIN ASPART 2 UNITS: 100 INJECTION, SOLUTION INTRAVENOUS; SUBCUTANEOUS at 08:16

## 2025-06-22 RX ADMIN — APIXABAN 2.5 MG: 2.5 TABLET, FILM COATED ORAL at 08:11

## 2025-06-22 RX ADMIN — CLONAZEPAM 0.5 MG: 0.5 TABLET ORAL at 08:10

## 2025-06-22 RX ADMIN — DICLOFENAC SODIUM TOPICAL GEL, 1% 4 G: 10 GEL TOPICAL at 11:49

## 2025-06-22 RX ADMIN — METHOCARBAMOL 500 MG: 500 TABLET ORAL at 03:36

## 2025-06-22 RX ADMIN — PANTOPRAZOLE SODIUM 40 MG: 40 TABLET, DELAYED RELEASE ORAL at 08:11

## 2025-06-22 RX ADMIN — LAMOTRIGINE 200 MG: 200 TABLET ORAL at 08:10

## 2025-06-22 RX ADMIN — ATENOLOL 25 MG: 25 TABLET ORAL at 19:41

## 2025-06-22 RX ADMIN — QUETIAPINE FUMARATE 100 MG: 100 TABLET ORAL at 17:03

## 2025-06-22 RX ADMIN — FUROSEMIDE 20 MG: 20 TABLET ORAL at 15:34

## 2025-06-22 RX ADMIN — PIMECROLIMUS: 10 CREAM TOPICAL at 19:41

## 2025-06-22 RX ADMIN — METHOCARBAMOL 500 MG: 500 TABLET ORAL at 23:21

## 2025-06-22 RX ADMIN — INSULIN ASPART 2 UNITS: 100 INJECTION, SOLUTION INTRAVENOUS; SUBCUTANEOUS at 21:03

## 2025-06-22 RX ADMIN — DICLOFENAC SODIUM TOPICAL GEL, 1% 4 G: 10 GEL TOPICAL at 19:41

## 2025-06-22 ASSESSMENT — ACTIVITIES OF DAILY LIVING (ADL)
ADLS_ACUITY_SCORE: 47
ADLS_ACUITY_SCORE: 44
ADLS_ACUITY_SCORE: 47
ADLS_ACUITY_SCORE: 44
ADLS_ACUITY_SCORE: 47
ADLS_ACUITY_SCORE: 44
ADLS_ACUITY_SCORE: 47
ADLS_ACUITY_SCORE: 47

## 2025-06-22 NOTE — TELEPHONE ENCOUNTER
R: [place]  Access Inpatient Adult Bed Call Log  6/22/25 @ 1:00am   Intake has called facilities that have not updated their bed status within the last 12 hours.     *METRO:  [place] -- [facility]: @ capacity.  [facility]: @ cap per website. No reviews overnight. Review 8am-10pm #[phone]  [facility] ([facility]): @ cap per website. Low acuity. #[phone]  [place] -- [facility]: @ cap per website. Low acuity only. #[phone]  [facility] ([facility]): @ cap per website. #[phone]  [facility]: @ cap per website. #[phone]  [facility]/ beds: POSTING 6 BEDS - Per Viri only 1 available. Ages 18-35, NO aggression/physical or sexual assault/violence hx, or drug abuse. #[phone]  [facility] ([facility]):  POSTING 1 BED. #[phone]  [facility] -- [facility]: @ cap per website. #[phone]  [facility] ([facility]): @ cap per website. No reviews overnight. #[phone]     Pt remains on waitlist pending appropriate placement availability.

## 2025-06-22 NOTE — PLAN OF CARE
"Goal Outcome Evaluation:      Plan of Care Reviewed With: patient    Overall Patient Progress: no change    VS: /60 (BP Location: Left arm)   Pulse 69   Temp 97.7  F (36.5  C) (Oral)   Resp 16   Ht 1.707 m (5' 7.2\")   Wt 93.7 kg (206 lb 9.1 oz)   SpO2 93%   BMI 32.16 kg/m     O2: O2>92% on RA   Output: Continent of B&B, Voiding spontaneously without difficulty in BR  Patient complained of burning with urination stating that it is a new symptom tonight, RN paged MD who ordered UA. UA was collected this AM   Last BM: 6/21   Activity: SBA with walker   Skin: Nonblanchable redness/scar to nose bridge, R calf scar, Blanchable redness bilateral breast folds and groin folds   Pain: Pain controlled with prn tylenol and scheduled medications   CMS: Alert &Ox3; disoriented to situation  Numbness and tingling bilateral feet; baseline   Dressing: none   Diet: Regular diet with BG checks   LDA: No IV access   Equipment: Personal belongings, walker   Plan: Tbd home vs inpatient psych   Additional Info: 1:1 sitter at bedside for safety  Patient frequently asking for Seroquel and tylenol throughout shift, states Seroquel is PRN \"as much as she wants\" despite education from writer that it is scheduled for twice a day and bedtime. RN gave prn aterax and prn risperidone for anxiety, and prn melatonin for sleep.  Patient able to make needs known using call light appropriately or communicating needs to sitter, call light in reach, continue POC, report given to oncoming RN     "

## 2025-06-22 NOTE — PLAN OF CARE
"Goal Outcome Evaluation:      Plan of Care Reviewed With: patient    Overall Patient Progress: improvingOverall Patient Progress: improving       VS: /48 (BP Location: Left arm)   Pulse 61   Temp 98.1  F (36.7  C) (Oral)   Resp 18   Ht 1.707 m (5' 7.2\")   Wt 91.8 kg (202 lb 6.4 oz)   SpO2 95%   BMI 31.51 kg/m     O2: >90% on room air. Infrequent productive cough. Denies SOB/N/V/chest pain    Output: Voiding spontaneously    Last BM: 6/21/25   Activity: WBAT. Xx1 with walker and gait belt    Skin: Nonblanchable redness/scar to nose bridge, R calf scar, Blanchable redness bilateral breast folds and groin folds    Pain: Pain controlled with prn tylenol and scheduled medications    CMS: Alert &Ox3; disoriented to situation  Numbness and tingling bilateral feet; baseline   Dressing: NA   Diet: regular   LDA: No IV Access    Equipment: Walker, gait belt, personal belongings, call light within reach.   Plan: Tbd home vs inpatient psych    Additional Info:  frequently seeking out staff requesting Seroquel                "

## 2025-06-22 NOTE — PROGRESS NOTES
[facility]    Medicine Progress Note - Hospitalist Service, GOLD TEAM 16    Date of Admission:  6/11/2025    Assessment & Plan   A: Patient is a 80 y/o woman who has a past medical history significant for bipolar disorder, borderline personality disorder, dependence personality disorder, OCD, anxiety, PTSD, insomnia, chronic pulmonary infiltrates, paroxysmal atrial fibrillation, sick sinus syndrome with pacemaker in place, hypertension, hyperlipidemia, chronic heart failure with preserved ejection fraction, COPD, asthma, past CVA, diabetes mellitus type II, GERD and chronic pain. Patient presented to the hospital on 11-Jun-2025 for acute psychosis.     Patient's  had called emergency medical services for acute psychotic behaviour and inability to care for patient at home. Patient had previously been hospitalized at [facility] from 16-May-2025 to 06-Jun-2025 for altered mental status and hypomania. Patient apparently was found in the seat of his car saying she needed to come to hospital for test; patient reportedly was exhibiting erratic behaviour.      P:  1.) Acute psychosis:  - Monitoring for safety.  - Patient has sitter at present.     2.) Bipolar disorder; borderline personality disorder; dependent personality disorder; OCD; anxiety; PTSD:  - Patient has been seen by Psychiatry.   - Patient currently on lamotrigine 200 mg twice daily, seroquel 100 mg twice daily, seroquel 200 mg at bedtime.   - Patient no longer on clomipramine.  - Patient on clonazepam 0.5 mg twice daily.  - Patient on hydroxyzine as needed.      3.) Covid-19 positive:  - Patient completed isolation period on 12-Jun-2025.     4.) Chronic pulmonary infiltrates with prior recurrent pneumonia, suspected to be duet to chronic aspiration from Zenker's diverticulum (previously repaired in 2017):  - Monitoring for evidence of infection.     5.) Suspected community-acquired pneumonia:  -  Patient has completed a course of azithromycin.     6.) Paroxysmal atrial fibrillation:  - Patient on eliquis for anticoagulation.     7.) Sick sinus syndrome; patient has pacemaker in place:  - To f/u as outpatient with Cardiology as scheduled.     8.) Hypertension:  - Patient on atenolol.     9.) Chronic heart failure with preserved ejection fraction:  - Patient on lasix.  - Monitoring for evidence of acute heart failure.     10.) Hyperlipidemia:  - Patient on crestor.     11.) COPD, asthma:  - Patient on singulair.  - Albuterol as needed.     12.) Diabetes mellitus type II with long-term use of insulin:  - On 12-Jun-2025, HbA1c was 8.1%.  - Patient usually on trulicity 3 mg weekly and toujeo 16 units every evening as outpatient..  - Patient currently on lantus 13 units every evening and insulin sliding scale.      13.) GERD:  - Patient on PPI.     14.) Chronic pain:  - Patient sees a Pain Clinic as outpatient. Patient previously had failed oxycodone and hydromorphone. Patient reportedly was to be started on butrans but this was not reflected on patient's home medication list.  - Monitoring for symptoms.  - To f/u with Pain Clinic as outpatient.     15.) History of gastroparesis:  - Gastric emptying study reportedly showed moderate to severe gastroparesis likely worsened by trulicity. Trulicity dose had been decreased as outpatient on 25-Apr-2025. Trulicity now on hold.      16.) History of CVA:  - Patient on eliquis for anticoagulation for atrial fibrillation.  - Patient is on crestor for hyperlipidemia.     17.) Dysuria:  - Monitoring for evidence of urinary tract infection.        Diet: Regular Diet Adult    Lubin Catheter: Not present  Lines: None     Cardiac Monitoring: None  Code Status: Full Code      Clinically Significant Risk Factors                             # DMII: A1C = 8.1 % (Ref range: <5.7 %) within past 6 months   # Obesity: Estimated body mass index is 31.51 kg/m  as calculated from the  "following:    Height as of this encounter: 1.707 m (5' 7.2\").    Weight as of this encounter: 91.8 kg (202 lb 6.4 oz).      # Financial/Environmental Concerns: none         Social Drivers of Health            Disposition Plan     Medically Ready for Discharge: Anticipated in 2-4 Days             Amandeep Cutler MD  Hospitalist Service, GOLD TEAM 16  [facility] [facility]  Securely message with [employer] (more info)  Text page via [employer] Paging/Directory   See signed in provider for up to date coverage information  ______________________________________________________________________    Interval History   Patient noted continue anxiety. Patient noted only having dysuria occasionally.     Physical Exam   Vital Signs: Temp: 97.5  F (36.4  C) Temp src: Oral BP: 132/55 Pulse: 64   Resp: 22 SpO2: 97 % O2 Device: None (Room air)    Weight: 202 lbs 6.4 oz    General: Patient comfortable, NAD.  "

## 2025-06-22 NOTE — TELEPHONE ENCOUNTER
R: [place]  Access Inpatient Bed Call Log 6/21/25 at 4:00 PM: Intake has called facilities that have not updated the bed status within the last 12 hours.           [facility] is at capacity.           [facility] is posting 0 beds. [phone] 4:18 PM At capacity.  [facility] is posting 0 beds. Negative covid required.  [facility] is posting 0 beds. Neg covid. No high school/Leah-psych. [phone]  [place] is posting 0 beds. [phone]  [facility] is posting 0 beds. [phone]  [facility] is posting 2 beds. Negative covid. [phone] Per call 4:15 PM, No Adult Beds  [facility] is posting 0 beds [phone].    Pt remains on the work list pending appropriate bed availability.

## 2025-06-22 NOTE — PROGRESS NOTES
Brief Cross Cover Note:     RN messaged reporting patient complaining of dysuria. Reports this is a reportedly new complaint, though RN reporting patient often forgetful. Patient had UA check on 6/11 and 6/14 both negative for infxn concerns. Given that most recent UA >7d ago, will order UA to eval for infxn concerns.     Tanika Garcia MD

## 2025-06-22 NOTE — TELEPHONE ENCOUNTER
R: [place]  Access Inpatient Bed Call Log 6/22/25 at 3:55PM: Intake has called facilities that have not updated the bed status within the last 12 hours.              Pt is on medical unit.  She wants [facility] placement only.     [facility] is at capacity.            [facility] is posting 0 beds. [phone] Per call at 7:27am to [facility]PS no beds.   [facility] is posting 0 beds. Negative covid required.   [facility] is posting 0 beds. Neg covid. No high school/Leah-psych. [phone] Per call at 7:28am to [facility] at capacity.   [facility] is posting 0 beds. [phone]   [facility] is posting 0 beds. [phone]    [facility] is posting 2 beds. Negative covid. [phone] Per call at 7:30am to [facility] 9 Adol beds and no YA or Child. Ages 18-35 only.   [facility] ([facility] is posting 0 beds [phone].         Pt remains on the work list pending appropriate bed availability.

## 2025-06-23 ENCOUNTER — TELEPHONE (OUTPATIENT)
Dept: BEHAVIORAL HEALTH | Facility: CLINIC | Age: 80
End: 2025-06-23
Payer: COMMERCIAL

## 2025-06-23 LAB
GLUCOSE BLDC GLUCOMTR-MCNC: 163 MG/DL (ref 70–99)
GLUCOSE BLDC GLUCOMTR-MCNC: 213 MG/DL (ref 70–99)
GLUCOSE BLDC GLUCOMTR-MCNC: 213 MG/DL (ref 70–99)
GLUCOSE BLDC GLUCOMTR-MCNC: 220 MG/DL (ref 70–99)
GLUCOSE BLDC GLUCOMTR-MCNC: 235 MG/DL (ref 70–99)

## 2025-06-23 PROCEDURE — 250N000013 HC RX MED GY IP 250 OP 250 PS 637

## 2025-06-23 PROCEDURE — 250N000013 HC RX MED GY IP 250 OP 250 PS 637: Performed by: INTERNAL MEDICINE

## 2025-06-23 PROCEDURE — 120N000002 HC R&B MED SURG/OB UMMC

## 2025-06-23 PROCEDURE — 250N000013 HC RX MED GY IP 250 OP 250 PS 637: Performed by: STUDENT IN AN ORGANIZED HEALTH CARE EDUCATION/TRAINING PROGRAM

## 2025-06-23 PROCEDURE — 99232 SBSQ HOSP IP/OBS MODERATE 35: CPT | Performed by: INTERNAL MEDICINE

## 2025-06-23 RX ORDER — QUETIAPINE FUMARATE 300 MG/1
300 TABLET, FILM COATED ORAL AT BEDTIME
Status: DISCONTINUED | OUTPATIENT
Start: 2025-06-23 | End: 2025-06-24 | Stop reason: HOSPADM

## 2025-06-23 RX ORDER — PANTOPRAZOLE SODIUM 40 MG/1
40 TABLET, DELAYED RELEASE ORAL
Qty: 60 TABLET | Refills: 0 | Status: SHIPPED | OUTPATIENT
Start: 2025-06-23

## 2025-06-23 RX ORDER — RISPERIDONE 0.5 MG/1
0.5 TABLET ORAL 2 TIMES DAILY PRN
Qty: 30 TABLET | Refills: 0 | Status: SHIPPED | OUTPATIENT
Start: 2025-06-23

## 2025-06-23 RX ORDER — CLONAZEPAM 0.5 MG/1
0.5 TABLET ORAL 2 TIMES DAILY
Qty: 60 TABLET | Refills: 0 | Status: SHIPPED | OUTPATIENT
Start: 2025-06-23

## 2025-06-23 RX ORDER — LIDOCAINE 4 G/G
1 PATCH TOPICAL EVERY 24 HOURS
Qty: 30 PATCH | Refills: 0 | Status: SHIPPED | OUTPATIENT
Start: 2025-06-23

## 2025-06-23 RX ORDER — METHOCARBAMOL 500 MG/1
500 TABLET, FILM COATED ORAL EVERY 6 HOURS PRN
Qty: 30 TABLET | Refills: 0 | Status: SHIPPED | OUTPATIENT
Start: 2025-06-23

## 2025-06-23 RX ORDER — KETOCONAZOLE 20 MG/G
CREAM TOPICAL 2 TIMES DAILY PRN
Qty: 15 G | Refills: 0 | Status: SHIPPED | OUTPATIENT
Start: 2025-06-23

## 2025-06-23 RX ORDER — QUETIAPINE FUMARATE 100 MG/1
100 TABLET, FILM COATED ORAL 2 TIMES DAILY
Qty: 60 TABLET | Refills: 0 | Status: SHIPPED | OUTPATIENT
Start: 2025-06-23

## 2025-06-23 RX ORDER — ACETAMINOPHEN 325 MG/1
650 TABLET ORAL EVERY 4 HOURS PRN
COMMUNITY
Start: 2025-06-23

## 2025-06-23 RX ORDER — QUETIAPINE FUMARATE 300 MG/1
300 TABLET, FILM COATED ORAL AT BEDTIME
Qty: 30 TABLET | Refills: 0 | Status: SHIPPED | OUTPATIENT
Start: 2025-06-23

## 2025-06-23 RX ORDER — HYDROXYZINE HYDROCHLORIDE 25 MG/1
25 TABLET, FILM COATED ORAL EVERY 6 HOURS PRN
Qty: 30 TABLET | Refills: 0 | Status: SHIPPED | OUTPATIENT
Start: 2025-06-23

## 2025-06-23 RX ADMIN — ACETAMINOPHEN 650 MG: 325 TABLET, FILM COATED ORAL at 00:30

## 2025-06-23 RX ADMIN — LAMOTRIGINE 200 MG: 200 TABLET ORAL at 19:37

## 2025-06-23 RX ADMIN — DICLOFENAC SODIUM TOPICAL GEL, 1% 4 G: 10 GEL TOPICAL at 15:21

## 2025-06-23 RX ADMIN — QUETIAPINE FUMARATE 100 MG: 100 TABLET ORAL at 17:23

## 2025-06-23 RX ADMIN — RISPERIDONE 0.5 MG: 0.5 TABLET, FILM COATED ORAL at 00:36

## 2025-06-23 RX ADMIN — ACETAMINOPHEN 650 MG: 325 TABLET, FILM COATED ORAL at 15:20

## 2025-06-23 RX ADMIN — ACETAMINOPHEN 650 MG: 325 TABLET, FILM COATED ORAL at 19:52

## 2025-06-23 RX ADMIN — DICLOFENAC SODIUM TOPICAL GEL, 1% 4 G: 10 GEL TOPICAL at 12:38

## 2025-06-23 RX ADMIN — INSULIN GLARGINE 13 UNITS: 100 INJECTION, SOLUTION SUBCUTANEOUS at 19:48

## 2025-06-23 RX ADMIN — DICLOFENAC SODIUM TOPICAL GEL, 1% 4 G: 10 GEL TOPICAL at 19:45

## 2025-06-23 RX ADMIN — LIDOCAINE 4% 1 PATCH: 40 PATCH TOPICAL at 19:46

## 2025-06-23 RX ADMIN — APIXABAN 2.5 MG: 2.5 TABLET, FILM COATED ORAL at 08:36

## 2025-06-23 RX ADMIN — FUROSEMIDE 20 MG: 20 TABLET ORAL at 15:20

## 2025-06-23 RX ADMIN — CLONAZEPAM 0.5 MG: 0.5 TABLET ORAL at 08:36

## 2025-06-23 RX ADMIN — ROSUVASTATIN 20 MG: 20 TABLET, FILM COATED ORAL at 19:38

## 2025-06-23 RX ADMIN — OLOPATADINE HYDROCHLORIDE 1 DROP: 1 SOLUTION OPHTHALMIC at 19:45

## 2025-06-23 RX ADMIN — CLONAZEPAM 0.5 MG: 0.5 TABLET ORAL at 19:37

## 2025-06-23 RX ADMIN — PANTOPRAZOLE SODIUM 40 MG: 40 TABLET, DELAYED RELEASE ORAL at 16:27

## 2025-06-23 RX ADMIN — RISPERIDONE 0.5 MG: 0.5 TABLET, FILM COATED ORAL at 16:27

## 2025-06-23 RX ADMIN — MONTELUKAST 10 MG: 10 TABLET, FILM COATED ORAL at 08:37

## 2025-06-23 RX ADMIN — Medication 25 MCG: at 08:37

## 2025-06-23 RX ADMIN — APIXABAN 2.5 MG: 2.5 TABLET, FILM COATED ORAL at 19:37

## 2025-06-23 RX ADMIN — PIMECROLIMUS: 10 CREAM TOPICAL at 08:46

## 2025-06-23 RX ADMIN — DICLOFENAC SODIUM TOPICAL GEL, 1% 4 G: 10 GEL TOPICAL at 08:37

## 2025-06-23 RX ADMIN — POLYETHYLENE GLYCOL 3350 17 G: 17 POWDER, FOR SOLUTION ORAL at 08:36

## 2025-06-23 RX ADMIN — Medication 1 MG: at 23:26

## 2025-06-23 RX ADMIN — ATENOLOL 25 MG: 25 TABLET ORAL at 19:38

## 2025-06-23 RX ADMIN — INSULIN ASPART 2 UNITS: 100 INJECTION, SOLUTION INTRAVENOUS; SUBCUTANEOUS at 08:41

## 2025-06-23 RX ADMIN — QUETIAPINE FUMARATE 100 MG: 100 TABLET ORAL at 08:37

## 2025-06-23 RX ADMIN — OLOPATADINE HYDROCHLORIDE 1 DROP: 1 SOLUTION OPHTHALMIC at 08:43

## 2025-06-23 RX ADMIN — LAMOTRIGINE 200 MG: 200 TABLET ORAL at 08:37

## 2025-06-23 RX ADMIN — PANTOPRAZOLE SODIUM 40 MG: 40 TABLET, DELAYED RELEASE ORAL at 08:37

## 2025-06-23 RX ADMIN — Medication 100 MG: at 08:36

## 2025-06-23 RX ADMIN — QUETIAPINE FUMARATE 300 MG: 300 TABLET ORAL at 21:32

## 2025-06-23 RX ADMIN — INSULIN ASPART 1 UNITS: 100 INJECTION, SOLUTION INTRAVENOUS; SUBCUTANEOUS at 21:37

## 2025-06-23 RX ADMIN — FUROSEMIDE 20 MG: 20 TABLET ORAL at 08:37

## 2025-06-23 RX ADMIN — Medication 1 CAPSULE: at 08:36

## 2025-06-23 RX ADMIN — INSULIN ASPART 1 UNITS: 100 INJECTION, SOLUTION INTRAVENOUS; SUBCUTANEOUS at 17:23

## 2025-06-23 RX ADMIN — INSULIN ASPART 2 UNITS: 100 INJECTION, SOLUTION INTRAVENOUS; SUBCUTANEOUS at 12:38

## 2025-06-23 RX ADMIN — ATENOLOL 25 MG: 25 TABLET ORAL at 08:37

## 2025-06-23 ASSESSMENT — ACTIVITIES OF DAILY LIVING (ADL)
ADLS_ACUITY_SCORE: 47
ADLS_ACUITY_SCORE: 44
ADLS_ACUITY_SCORE: 47
ADLS_ACUITY_SCORE: 44
ADLS_ACUITY_SCORE: 44
ADLS_ACUITY_SCORE: 47
ADLS_ACUITY_SCORE: 44
ADLS_ACUITY_SCORE: 47
ADLS_ACUITY_SCORE: 44
ADLS_ACUITY_SCORE: 47
ADLS_ACUITY_SCORE: 44

## 2025-06-23 NOTE — TELEPHONE ENCOUNTER
R: per bed search at 8 am: ([address] only/needs private, per notes):    [address] does not have a private room avail currently.    Pt remains on the work list pending appropriate bed availability.

## 2025-06-23 NOTE — PROGRESS NOTES
"  VS: /53 (BP Location: Right arm)   Pulse 61   Temp 97.7  F (36.5  C) (Oral)   Resp 16   Ht 1.707 m (5' 7.2\")   Wt 92 kg (202 lb 14.4 oz)   SpO2 96%   BMI 31.59 kg/m      O2: RA   Output: Voids in toilet   Last BM: 6/21   Activity: SBA with walker   Skin: See skin assessment   Pain: Complains of pain in lower back area   CMS: Baseline numbness/tingling in extremities   Dressing: none   Diet: reg   LDA: none   Equipment: walker   Plan: Discharge home tomorrow with    Additional Info:       Her prescriptions are in the med room.    This RN called her  and left a voice mail to find out what time he would be picking her up.    "

## 2025-06-23 NOTE — PLAN OF CARE
"Goal Outcome Evaluation:      Plan of Care Reviewed With: patient    Overall Patient Progress: no change    VS: /66 (BP Location: Right arm)   Pulse 66   Temp 98.5  F (36.9  C) (Axillary)   Resp 18   Ht 1.707 m (5' 7.2\")   Wt 91.8 kg (202 lb 6.4 oz)   SpO2 98%   BMI 31.51 kg/m     O2: O2>92% on RA   Output: Continent of B&B, Voiding spontaneously without difficulty in BR   Last BM: 6/21   Activity: SBA with walker   Skin: Nonblanchable redness/scar to nose bridge, R calf scar, Blanchable redness bilateral breast folds and groin folds   Pain: Pain controlled with prn tylenol, prn robaxin, and scheduled medications   CMS: Alert &Ox3; disoriented to situation  Numbness and tingling bilateral feet; baseline   Dressing: none   Diet: Regular diet with BG checks   LDA: No IV access   Equipment: Personal belongings, walker   Plan: Tbd home vs inpatient psych   Additional Info: 1:1 sitter at bedside for safety  Patient frequently asking for Seroquel and tylenol throughout shift, states Seroquel is PRN \"as much as she wants\" despite education from writer that it is scheduled for twice a day and bedtime. RN gave prn aterax and prn risperidone for anxiety, and prn melatonin for sleep.  Patient slept minimally and intermittently waking up frequently to ask for medication.  Patient able to make needs known using call light appropriately or communicating needs to sitter, call light in reach, continue POC, report given to oncoming RN     "

## 2025-06-23 NOTE — CONSULTS
"      Psychiatry Consultation; Follow up              Reason for Consult, requesting source:    Follow up  Requesting source: David Leon    Labs and imaging reviewed              Interim history:    Per staff recurrent request for Seroquel PRN, minimal sleep. PRN risperidone and Attarax given    Today she mentions to feel tired and sleepy. She just want to get her seroquel so it can helps with her \"anxiety\". She mentions that besides that she feels ok.         Current Medications:     Current Facility-Administered Medications   Medication Dose Route Frequency Provider Last Rate Last Admin    apixaban ANTICOAGULANT (ELIQUIS) tablet 2.5 mg  2.5 mg Oral BID Ashley Santos NP   2.5 mg at 06/23/25 0836    atenolol (TENORMIN) tablet 25 mg  25 mg Oral BID Ashley Santos NP   25 mg at 06/23/25 0837    clonazePAM (klonoPIN) half-tab 0.5 mg  0.5 mg Oral BID Kristopher Dorsey MD   0.5 mg at 06/23/25 0836    diclofenac (VOLTAREN) 1 % topical gel 4 g  4 g Topical 4x Daily Ashley Santos NP   4 g at 06/23/25 0837    furosemide (LASIX) tablet 20 mg  20 mg Oral BID Ashley Santos NP   20 mg at 06/23/25 0837    insulin aspart (NovoLOG) injection (RAPID ACTING)  1-7 Units Subcutaneous TID AC Ashley Santos NP   2 Units at 06/23/25 0841    insulin aspart (NovoLOG) injection (RAPID ACTING)  1-5 Units Subcutaneous At Bedtime Ashley Santos NP   2 Units at 06/22/25 2103    insulin glargine (LANTUS PEN) injection 13 Units  13 Units Subcutaneous QPM David Leon MD   13 Units at 06/22/25 1941    lamoTRIgine (LaMICtal) tablet 200 mg  200 mg Oral BID Rachael Rowland PA-C   200 mg at 06/23/25 0837    Lidocaine (LIDOCARE) 4 % Patch 1 patch  1 patch Transdermal Q24h Amandeep Cutler MD   1 patch at 06/22/25 1941    montelukast (SINGULAIR) tablet 10 mg  10 mg Oral Daily Ashley Santos NP   10 mg at 06/23/25 0837    olopatadine (PATANOL) 0.1 % ophthalmic solution 1 drop  1 drop Both Eyes BID " "Ashley Santos NP   1 drop at 06/23/25 0843    pantoprazole (PROTONIX) EC tablet 40 mg  40 mg Oral BID AC David Leon MD   40 mg at 06/23/25 0837    pimecrolimus (ELIDEL) 1 % cream   Topical BID Ashley Santos NP   Given at 06/23/25 0846    polyethylene glycol (MIRALAX) Packet 17 g  17 g Oral Daily Ashley Santos NP   17 g at 06/23/25 0836    psyllium (METAMUCIL/KONSYL) capsule 1 capsule  1 capsule Oral Daily Ashley Santos NP   1 capsule at 06/23/25 0836    pyridOXINE (VITAMIN B6) tablet 100 mg  100 mg Oral Daily Ashley Santos NP   100 mg at 06/23/25 0836    QUEtiapine (SEROquel) tablet 100 mg  100 mg Oral BID Erika Cloud MD   100 mg at 06/23/25 0837    QUEtiapine (SEROquel) tablet 200 mg  200 mg Oral At Bedtime Erika Cloud MD   200 mg at 06/22/25 2103    rosuvastatin (CRESTOR) tablet 20 mg  20 mg Oral QPM Ashley Santos NP   20 mg at 06/22/25 1941    Vitamin D3 (CHOLECALCIFEROL) tablet 25 mcg  25 mcg Oral Daily Ashley Santos NP   25 mcg at 06/23/25 0837              MSE:   Appearance: fatigued and alert  Attitude:  cooperative  Eye Contact:  fair  Mood:  \"fair\"  Affect:  mood congruent and intensity is normal  Speech:  clear, coherent  Psychomotor Behavior:  no evidence of tardive dyskinesia, dystonia, or tics  Thought Process:  logical, linear, and goal oriented  Associations:  no loose associations  Thought Content:  no evidence of suicidal ideation or homicidal ideation and no evidence of psychotic thought  Insight:  limited  Judgement:  limited  Oriented to:  time, person, and place  Attention Span and Concentration:  fair  Recent and Remote Memory:  intact    Vital signs:  Temp: 97.7  F (36.5  C) Temp src: Oral BP: 133/53 Pulse: 61   Resp: 16 SpO2: 96 % O2 Device: None (Room air)   Height: 170.7 cm (5' 7.2\") Weight: 92 kg (202 lb 14.4 oz)  Estimated body mass index is 31.59 kg/m  as calculated from the " "following:    Height as of this encounter: 1.707 m (5' 7.2\").    Weight as of this encounter: 92 kg (202 lb 14.4 oz).             DSM-5 Diagnosis:   Bipolar 2 disorder (current shalonda possibly 2/2 Covid and mirtazapine)  OCD  Panic disorder  PTSD  Insomnia           Assessment:   79-year-old female with a history of mental health issues including bipolar disorder, history of paroxysmal A-fib with previous CVA and chronic anticoagulation, as well as COPD and CHF, who presents to the emergency department by ambulance after the patient was found in the seat of the 's car wanting to get some tests at the hospital but with obvious psychotic behavior.  Presentation thus far has been consistent with shalonda, which appears to be her first episode.  Contributing factors could include COVID and [facility]     Today Dominique presents less manic. She does no represent a danger to herself or others. We decided to increase Seroquel Bedtime dose so her total Seroquel dose be 500 mg. She can follow up outpatient with her usual psychiatric provider.           Summary of Recommendations:   Legal: voluntary  Safety: 1:1  Labs/Studies: none  Medications:  --Klonopin 0.5mg BID (change 6/20)  --Seroquel 100 mg BID in the morning and 300 mg at bedtime.  --Lamictal 200mg BID  Follow-Up: we will sign off this case please reach out if necessary.    This patient was seen and discussed with my attending physician.  Erika Gutierrez MD  N Psychiatry Resident          "

## 2025-06-23 NOTE — TELEPHONE ENCOUNTER
R: [place]  Access Inpatient Adult Bed Call Log  6/23/25 @ 1:00am   Intake has called facilities that have not updated their bed status within the last 12 hours.     *METRO:  [place] -- [facility]: @ capacity.  [facility]: @ cap per website. No reviews overnight. Review 8am-10pm #[phone]  [facility] ([facility]): @ cap per website. Low acuity. #[phone]  [place] -- [facility]: @ cap per website. Low acuity only. #[phone]  [facility] ([facility]): @ cap per website. #[phone]  [facility]: @ cap per website. #[phone]  [facility]/ beds: POSTING 6 BEDS - Per Kaycee, 2 available. Ages 18-35, NO aggression/physical or sexual assault/violence hx, or drug abuse. #[phone]  [place]  [facility] ([facility]):  @ cap per website. #[phone]  [place] [facility]: @ cap per website. #[phone]  [facility] ([facility]): @ cap per website. No reviews overnight. #[phone]     Pt remains on waitlist pending appropriate placement availability.

## 2025-06-23 NOTE — PROGRESS NOTES
[facility]    Medicine Progress Note - Hospitalist Service, GOLD TEAM 16    Date of Admission:  6/11/2025    Assessment & Plan   A: Patient is a 78 y/o woman who has a past medical history significant for bipolar disorder, borderline personality disorder, dependence personality disorder, OCD, anxiety, PTSD, insomnia, chronic pulmonary infiltrates, paroxysmal atrial fibrillation, sick sinus syndrome with pacemaker in place, hypertension, hyperlipidemia, chronic heart failure with preserved ejection fraction, COPD, asthma, past CVA, diabetes mellitus type II, GERD and chronic pain. Patient presented to the hospital on 11-Jun-2025 for acute psychosis.     Patient's  had called emergency medical services for acute psychotic behaviour and inability to care for patient at home. Patient had previously been hospitalized at [facility] from 16-May-2025 to 06-Jun-2025 for altered mental status and hypomania. Patient apparently was found in the seat of his car saying she needed to come to hospital for test; patient reportedly was exhibiting erratic behaviour.      P:  1.) Acute psychosis:  - Monitoring for safety.  - Patient has sitter at present.     2.) Bipolar disorder; borderline personality disorder; dependent personality disorder; OCD; anxiety; PTSD:  - Patient has been seen by Psychiatry.   - Patient currently on lamotrigine 200 mg twice daily, seroquel 100 mg twice daily, seroquel 300 mg at bedtime.   - Patient no longer on clomipramine.  - Patient on clonazepam 0.5 mg twice daily.  - Patient on hydroxyzine as needed.      3.) Covid-19 positive:  - Patient completed isolation period on 12-Jun-2025.     4.) Chronic pulmonary infiltrates with prior recurrent pneumonia, suspected to be duet to chronic aspiration from Zenker's diverticulum (previously repaired in 2017):  - Monitoring for evidence of infection.     5.) Suspected community-acquired pneumonia:  -  Patient has completed a course of azithromycin.     6.) Paroxysmal atrial fibrillation:  - Patient on eliquis for anticoagulation.     7.) Sick sinus syndrome; patient has pacemaker in place:  - To f/u as outpatient with Cardiology as scheduled.     8.) Hypertension:  - Patient on atenolol.     9.) Chronic heart failure with preserved ejection fraction:  - Patient on lasix.  - Monitoring for evidence of acute heart failure.     10.) Hyperlipidemia:  - Patient on crestor.     11.) COPD, asthma:  - Patient on singulair.  - Albuterol as needed.     12.) Diabetes mellitus type II with long-term use of insulin:  - On 12-Jun-2025, HbA1c was 8.1%.  - Patient usually on trulicity 3 mg weekly and toujeo 16 units every evening as outpatient..  - Patient currently on lantus 13 units every evening and insulin sliding scale. Anticipate resumption of patient's usual dose of toujeo as well as trulicty as outpatient.      13.) GERD:  - Patient on PPI.     14.) Chronic pain:  - Patient sees a Pain Clinic as outpatient. Patient previously had failed oxycodone and hydromorphone. Patient reportedly was to be started on butrans but this was not reflected on patient's home medication list.  - Monitoring for symptoms.  - To f/u with Pain Clinic as outpatient.     15.) History of gastroparesis:  - Gastric emptying study reportedly showed moderate to severe gastroparesis likely worsened by trulicity. Trulicity dose had been decreased as outpatient on 25-Apr-2025. Trulicity now on hold.      16.) History of CVA:  - Patient on eliquis for anticoagulation for atrial fibrillation.  - Patient is on crestor for hyperlipidemia.      17.) Dysuria:  - Monitoring for evidence of urinary tract infection.          Diet: Regular Diet Adult    Lubin Catheter: Not present  Lines: None     Cardiac Monitoring: None  Code Status: Full Code      Clinically Significant Risk Factors                             # DMII: A1C = 8.1 % (Ref range: <5.7 %) within past  "6 months   # Obesity: Estimated body mass index is 31.59 kg/m  as calculated from the following:    Height as of this encounter: 1.707 m (5' 7.2\").    Weight as of this encounter: 92 kg (202 lb 14.4 oz).      # Financial/Environmental Concerns: none         Social Drivers of Health            Disposition Plan     Medically Ready for Discharge: Anticipated Tomorrow             Amandeep Cutler MD  Hospitalist Service, GOLD TEAM 16  [facility]  Securely message with [email or web address] (more info)  Text page via [email or web address] Paging/Directory   See signed in provider for up to date coverage information  ______________________________________________________________________    Interval History   Patient noted feeling well and noted no new problems.     Physical Exam   Vital Signs: Temp: 97.7  F (36.5  C) Temp src: Oral BP: 133/53 Pulse: 61   Resp: 16 SpO2: 96 % O2 Device: None (Room air)    Weight: 202 lbs 14.4 oz    General: Patient comfortable, NAD.  "

## 2025-06-23 NOTE — TELEPHONE ENCOUNTER
R: [place]  Access Inpatient Bed Call Log 6/23/2025 @ 4:15 PM:  Intake has called facilities that have not updated their bed status within the last 12 hours.    [facility] is posting 0 beds. Pt recommended for 3B only, private room - no appropriate beds currently available                  [facility] is posting 0 beds. [phone]. Per Miley @ 4:17 PM, at capacity   [facility] is posting 0 beds. Negative covid required.   [facility] is posting 0 beds. Neg covid. No high school/[facility]-psych. [phone]. Per Reggie @ 4:18 PM, SD/low acuity (no psychosis, shalonda, aggression, HI)    [facility] is posting 0 beds. [phone].   [facility] is posting 0 beds. [phone].   [facility] is posting 6 beds. (Ages 18-35) Negative covid, no aggression, physical or sexual assault, violence hx or drug abuse, or psychosis.  [phone]. Per Aletha @ 4:21 PM, 1 female bed in a shared room  [facility] is posting 0 beds.   [facility] ([facility]) is posting 0 beds. [phone]  [facility] is posting 0 beds. [phone]. Per Jessica @ 5:03 PM, they are capped - she suggests calling back tomorrow after 1PM    Pt remains on the work list pending appropriate bed availability.

## 2025-06-24 ENCOUNTER — TELEPHONE (OUTPATIENT)
Dept: BEHAVIORAL HEALTH | Facility: CLINIC | Age: 80
End: 2025-06-24
Payer: COMMERCIAL

## 2025-06-24 VITALS
DIASTOLIC BLOOD PRESSURE: 50 MMHG | BODY MASS INDEX: 31.84 KG/M2 | TEMPERATURE: 97.4 F | SYSTOLIC BLOOD PRESSURE: 136 MMHG | HEIGHT: 67 IN | RESPIRATION RATE: 18 BRPM | HEART RATE: 66 BPM | OXYGEN SATURATION: 95 % | WEIGHT: 202.9 LBS

## 2025-06-24 LAB
GLUCOSE BLDC GLUCOMTR-MCNC: 271 MG/DL (ref 70–99)
GLUCOSE BLDC GLUCOMTR-MCNC: 272 MG/DL (ref 70–99)

## 2025-06-24 PROCEDURE — 250N000013 HC RX MED GY IP 250 OP 250 PS 637

## 2025-06-24 PROCEDURE — 250N000013 HC RX MED GY IP 250 OP 250 PS 637: Performed by: STUDENT IN AN ORGANIZED HEALTH CARE EDUCATION/TRAINING PROGRAM

## 2025-06-24 PROCEDURE — 250N000013 HC RX MED GY IP 250 OP 250 PS 637: Performed by: INTERNAL MEDICINE

## 2025-06-24 PROCEDURE — 99239 HOSP IP/OBS DSCHRG MGMT >30: CPT | Performed by: INTERNAL MEDICINE

## 2025-06-24 RX ADMIN — LAMOTRIGINE 200 MG: 200 TABLET ORAL at 08:46

## 2025-06-24 RX ADMIN — OLOPATADINE HYDROCHLORIDE 1 DROP: 1 SOLUTION OPHTHALMIC at 08:59

## 2025-06-24 RX ADMIN — CLONAZEPAM 0.5 MG: 0.5 TABLET ORAL at 08:45

## 2025-06-24 RX ADMIN — Medication 1 CAPSULE: at 08:46

## 2025-06-24 RX ADMIN — ACETAMINOPHEN 650 MG: 325 TABLET, FILM COATED ORAL at 02:08

## 2025-06-24 RX ADMIN — APIXABAN 2.5 MG: 2.5 TABLET, FILM COATED ORAL at 08:46

## 2025-06-24 RX ADMIN — MONTELUKAST 10 MG: 10 TABLET, FILM COATED ORAL at 08:45

## 2025-06-24 RX ADMIN — POLYETHYLENE GLYCOL 3350 17 G: 17 POWDER, FOR SOLUTION ORAL at 08:46

## 2025-06-24 RX ADMIN — DICLOFENAC SODIUM TOPICAL GEL, 1% 4 G: 10 GEL TOPICAL at 08:57

## 2025-06-24 RX ADMIN — QUETIAPINE FUMARATE 100 MG: 100 TABLET ORAL at 08:45

## 2025-06-24 RX ADMIN — Medication 100 MG: at 08:45

## 2025-06-24 RX ADMIN — PANTOPRAZOLE SODIUM 40 MG: 40 TABLET, DELAYED RELEASE ORAL at 08:46

## 2025-06-24 RX ADMIN — HYDROXYZINE HYDROCHLORIDE 25 MG: 25 TABLET, FILM COATED ORAL at 02:08

## 2025-06-24 RX ADMIN — INSULIN ASPART 3 UNITS: 100 INJECTION, SOLUTION INTRAVENOUS; SUBCUTANEOUS at 08:55

## 2025-06-24 RX ADMIN — Medication 25 MCG: at 08:46

## 2025-06-24 RX ADMIN — PIMECROLIMUS: 10 CREAM TOPICAL at 08:57

## 2025-06-24 RX ADMIN — ATENOLOL 25 MG: 25 TABLET ORAL at 08:45

## 2025-06-24 RX ADMIN — FUROSEMIDE 20 MG: 20 TABLET ORAL at 08:45

## 2025-06-24 ASSESSMENT — ACTIVITIES OF DAILY LIVING (ADL)
ADLS_ACUITY_SCORE: 39

## 2025-06-24 NOTE — DISCHARGE SUMMARY
"[facility]  Hospitalist Discharge Summary      Date of Admission:  6/11/2025  Date of Discharge:  6/24/2025  Discharging Provider: Elicia Craig MD  Discharge Service: Hospitalist Service, GOLD TEAM 16  Today was first encounter for patient.    Discharge Diagnoses   See below     Clinically Significant Risk Factors     # DMII: A1C = 8.1 % (Ref range: <5.7 %) within past 6 months  # Obesity: Estimated body mass index is 31.59 kg/m  as calculated from the following:    Height as of this encounter: 1.707 m (5' 7.2\").    Weight as of this encounter: 92 kg (202 lb 14.4 oz).       Follow-ups Needed After Discharge   Follow-up Appointments       Hospital Follow-up with Existing Primary Care Provider (PCP)          Schedule Primary Care visit within: 7 Days       ADULT [facility] Specialty Follow-up and recommended labs and tests      Follow up with Psychiatry as scheduled.    Appointments on [place] and/or [facility] (with [facility] or [facility] provider or service). Call [phone] if you haven't heard regarding these appointments within 7 days of discharge.                Unresulted Labs Ordered in the Past 30 Days of this Admission       No orders found from 5/12/2025 to 6/12/2025.            Discharge Disposition   Discharged to home  Condition at discharge: Stable    Hospital Course     She is is being discharged to home as per plan from yesterday .  I saw the patient briefly as she was scheduled to be picked up by  at 10 am .  I spoke with BAILEY romeo charge nurse ,no new concerns since last night  Hand off from Dr Amandeep Cutler   She has been cleared by psychiatry to go home with support with out pt psych follow up .Her  seroquel was increased to 500 mg total dose and current dose of klonopin is 0.5 mg TID.  She will resume her usual diabetic regimen on discharge as well . She told me her  Fady will be picking her up soon.        A: Patient is a 80 y/o woman " who has a past medical history significant for bipolar disorder, borderline personality disorder, dependence personality disorder, OCD, anxiety, PTSD, insomnia, chronic pulmonary infiltrates, paroxysmal atrial fibrillation, sick sinus syndrome with pacemaker in place, hypertension, hyperlipidemia, chronic heart failure with preserved ejection fraction, COPD, asthma, past CVA, diabetes mellitus type II, GERD and chronic pain. Patient presented to the hospital on 11-Jun-2025 for acute psychosis.     Patient's  had called emergency medical services for acute psychotic behaviour and inability to care for patient at home. Patient had previously been hospitalized at [facility] from 16-May-2025 to 06-Jun-2025 for altered mental status and hypomania. Patient apparently was found in the seat of his car saying she needed to come to hospital for test; patient reportedly was exhibiting erratic behaviour.      P:  1.) Acute psychosis:  resolved     2.) Bipolar disorder; borderline personality disorder; dependent personality disorder; OCD; anxiety; PTSD:  - Patient has been seen by Psychiatry and medications adjusted     -     3.) Covid-19 positive:  - Patient completed isolation period on 12-Jun-2025.     4.) Chronic pulmonary infiltrates with prior recurrent pneumonia, suspected to be due  to chronic aspiration from Zenker's diverticulum (previously repaired in 2017):       5.) Suspected community-acquired pneumonia:  - Patient has completed a course of azithromycin.     6.) Paroxysmal atrial fibrillation:  - Patient on eliquis for anticoagulation.     7.) Sick sinus syndrome; patient has pacemaker in place:  - To f/u as outpatient with Cardiology as scheduled.     8.) Hypertension:  - Patient on atenolol.     9.) Chronic heart failure with preserved ejection fraction:  - Patient on lasix.       10.) Hyperlipidemia:  - Patient on crestor.     11.) COPD, asthma:  - Patient on singulair.  - Albuterol as needed.      12.) Diabetes mellitus type II with long-term use of insulin:  - On 12-Jun-2025, HbA1c was 8.1%.  - Patient usually on trulicity 3 mg weekly and toujeo 16 units every evening as outpatient..  - Patient currently on lantus 13 units every evening and insulin sliding scale. Resumption of patient's usual dose of toujeo as well as trulicty as outpatient.      13.) GERD:  - Patient on PPI.     14.) Chronic pain:  - Patient sees a Pain Clinic as outpatient. Patient previously had failed oxycodone and hydromorphone. Patient reportedly was to be started on butrans but this was not reflected on patient's home medication list.    - To f/u with Pain Clinic as outpatient.     15.) History of gastroparesis:  - Gastric emptying study reportedly showed moderate to severe gastroparesis likely worsened by trulicity. Trulicity dose had been decreased as outpatient on 25-Apr-2025. Trulicity now on hold.      16.) History of CVA:  - Patient on eliquis for anticoagulation for atrial fibrillation.  - Patient is on crestor for hyperlipidemia.      17.) Dysuria:  Resolved     Consultations This Hospital Stay   PSYCHIATRY IP CONSULT  CARE MANAGEMENT / SOCIAL WORK IP CONSULT  INFECTION PREVENTION IP CONSULT  PSYCHIATRY IP CONSULT  PSYCHIATRY IP CONSULT  WOUND OSTOMY CONTINENCE NURSE  IP CONSULT  PSYCHIATRY IP CONSULT    Code Status   Full Code    Time Spent on this Encounter   I, Elicia Craig MD, personally saw the patient today and spent greater than 30 minutes discharging this patient.       Elicia Craig MD  [facility] MED SURG ORTHOPEDIC  [address] [place] [id number]  Phone: [phone]  Fax: [phone]  ______________________________________________________________________    Physical Exam   Vital Signs: Temp: 97.2  F (36.2  C) Temp src: Oral BP: (!) 143/43 Pulse: 61   Resp: 20 SpO2: 92 % O2 Device: None (Room air)    Weight: 202 lbs 14.4 oz       Alert and oriented . In no acute distress  .  Chest ;  No respiratory distress noted .  Cardiovascular Exam ; S1 & S2 .  GI ; Abdomen is non distended. BS positive .  Skin ; no jaundice noted.  Psych ; Ramo mood & affect.         Primary Care Physician   Kiesha Sr    Discharge Orders      Reason for your hospital stay    Bipolar disorder     Activity    Your activity upon discharge: As tolerated     ADULT [facility] Specialty Follow-up and recommended labs and tests    Follow up with Psychiatry as scheduled.    Appointments on [place] and/or [facility] (with [facility] or [facility] provider or service). Call [phone] if you haven't heard regarding these appointments within 7 days of discharge.     Diet    Follow this diet upon discharge: Regular diet     Hospital Follow-up with Existing Primary Care Provider (PCP)            Significant Results and Procedures   Most Recent 3 CBC's:  Recent Labs   Lab Test 06/12/25  0627 06/11/25  0918 09/20/24  0559   WBC 6.4 7.1 6.5   HGB 11.9 13.1 9.0*   MCV 91 91 96    363 254     Most Recent 3 BMP's:  Recent Labs   Lab Test 06/24/25  0147 06/23/25  2131 06/23/25  1640 06/12/25  0809 06/12/25  0627 06/11/25  0929 06/11/25  0918 09/20/24  0559   NA  --   --   --   --  138  --  138 137   POTASSIUM  --   --   --   --  3.9  --  3.8 4.0   CHLORIDE  --   --   --   --  100  --  99 102   CO2  --   --   --   --  23 --  25 27   BUN  --   --   --   --  16.8  --  19.1 12.7   CR  --   --   --   --  0.90  --  0.88 0.66   ANIONGAP  --   --   --   --  15  --  14 8   KHUSHI  --   --   --   --  9.1  --  10.2 8.9   * 235* 163*   < > 191*   < > 197* 135*    < > = values in this interval not displayed.       Discharge Medications      Review of your medicines        START taking        Dose / Directions   clonazePAM 0.5 MG tablet  Commonly known as: klonoPIN  Used for: Anxiety      Dose: 0.5 mg  Take 1 tablet (0.5 mg) by mouth 2 times daily.  Quantity: 60 tablet  Refills: 0     hydrOXYzine HCl 25 MG tablet  Commonly known as:  ATARAX  Used for: Anxiety      Dose: 25 mg  Take 1 tablet (25 mg) by mouth every 6 hours as needed for anxiety.  Quantity: 30 tablet  Refills: 0     Lidocaine 4 % Patch  Commonly known as: LIDOCARE  Used for: Low back pain, unspecified back pain laterality, unspecified chronicity, unspecified whether sciatica present      Dose: 1 patch  Place 1 patch over 12 hours onto the skin every 24 hours. To prevent lidocaine toxicity, patient should be patch free for 12 hrs daily.  Quantity: 30 patch  Refills: 0     methocarbamol 500 MG tablet  Commonly known as: ROBAXIN  Used for: Low back pain, unspecified back pain laterality, unspecified chronicity, unspecified whether sciatica present      Dose: 500 mg  Take 1 tablet (500 mg) by mouth every 6 hours as needed for muscle spasms.  Quantity: 30 tablet  Refills: 0     pantoprazole 40 MG EC tablet  Commonly known as: PROTONIX  Used for: Gastroesophageal reflux disease, unspecified whether esophagitis present      Dose: 40 mg  Take 1 tablet (40 mg) by mouth 2 times daily (before meals).  Quantity: 60 tablet  Refills: 0     risperiDONE 0.5 MG tablet  Commonly known as: risperDAL  Used for: Anxiety      Dose: 0.5 mg  Take 1 tablet (0.5 mg) by mouth 2 times daily as needed (agitation, anxiety).  Quantity: 30 tablet  Refills: 0            CHANGE how you take these medications        Dose / Directions   * acetaminophen 500 MG tablet  Commonly known as: TYLENOL  This may have changed: Another medication with the same name was added. Make sure you understand how and when to take each.      Take 2 Tablets (1,000 mg) by mouth three times a day. 24 hour limit of acetaminophen (TYLENOL) is 4000mg. Indications: Pain  Refills: 0     * acetaminophen 325 MG tablet  Commonly known as: TYLENOL  This may have changed: You were already taking a medication with the same name, and this prescription was added. Make sure you understand how and when to take each.  Used for: Low back pain, unspecified  back pain laterality, unspecified chronicity, unspecified whether sciatica present      Dose: 650 mg  Take 2 tablets (650 mg) by mouth every 4 hours as needed for mild pain or other (and adjunct with moderate or severe pain or per patient request).  Refills: 0     ketoconazole 2 % external cream  Commonly known as: NIZORAL  This may have changed:   when to take this  reasons to take this  Used for: Rash      Apply topically 2 times daily as needed for irritation.  Quantity: 15 g  Refills: 0     * QUEtiapine 100 MG tablet  Commonly known as: SEROquel  This may have changed:   medication strength  See the new instructions.      Dose: 100 mg  Take 1 tablet (100 mg) by mouth 2 times daily.  Quantity: 60 tablet  Refills: 0     * QUEtiapine 300 MG tablet  Commonly known as: SEROquel  This may have changed:   medication strength  how much to take      Dose: 300 mg  Take 1 tablet (300 mg) by mouth at bedtime.  Quantity: 30 tablet  Refills: 0           * This list has 4 medication(s) that are the same as other medications prescribed for you. Read the directions carefully, and ask your doctor or other care provider to review them with you.                CONTINUE these medicines which have NOT CHANGED        Dose / Directions   albuterol (2.5 MG/3ML) 0.083% neb solution  Commonly known as: PROVENTIL      Inhale 1 Each (2.5 mg) every 4 hours as needed for Wheezing.  Refills: 0     apixaban ANTICOAGULANT 2.5 MG tablet  Commonly known as: ELIQUIS      Take 1 Tablet (2.5 mg) by mouth two times a day.  Refills: 0     atenolol 25 MG tablet  Commonly known as: TENORMIN      Dose: 25 mg  Take 25 mg by mouth 2 times daily.  Refills: 0     diclofenac 1 % topical gel  Commonly known as: VOLTAREN      Dose: 4 g  Apply 4 g topically 4 times daily.  Refills: 0     fluticasone 50 MCG/ACT nasal spray  Commonly known as: FLONASE      Dose: 1 spray  Spray 1 spray into both nostrils daily.  Refills: 0     FreeStyle William 3 Plus Sensor Misc       USE AS DIRECTED FOR CONTINOUS BLOOD GLUCOSE MONITORING. REPLACE SENSOR EVERY 15 DAYS  Refills: 0     furosemide 20 MG tablet  Commonly known as: LASIX      Dose: 20 mg  Take 20 mg by mouth 2 times daily.  Refills: 0     lamoTRIgine 200 MG tablet  Commonly known as: LaMICtal      Dose: 200 mg  Take 200 mg by mouth 2 times daily.  Refills: 0     loperamide 2 MG capsule  Commonly known as: IMODIUM      Dose: 2 mg  Take 2 mg by mouth 4 times daily as needed for diarrhea.  Refills: 0     melatonin 1 MG Tabs tablet      Dose: 0.5-2 mg  Take 0.5-2 mg by mouth at bedtime. May take 15 mg sometimes.  Refills: 0     montelukast 10 MG tablet  Commonly known as: SINGULAIR      Dose: 10 mg  Take 10 mg by mouth daily.  Refills: 0     multivitamin w/minerals tablet      Dose: 1 tablet  Take 1 tablet by mouth daily.  Refills: 0     olopatadine 0.1 % ophthalmic solution  Commonly known as: PATANOL      Place 1 Drop into both eyes two times a day.  Refills: 0     pimecrolimus 1 % external cream  Commonly known as: ELIDEL      Apply topically BID to rash on trunk and body fold areas.  Refills: 0     polyethylene glycol 17 GM/Dose powder  Commonly known as: MIRALAX      Dose: 17 g  Take 17 g by mouth daily.  Refills: 0     psyllium 0.52 g capsule  Commonly known as: METAMUCIL/KONSYL      Take 1 Capsule (0.52 g) by mouth daily.  Refills: 0     pyridOXINE 100 MG Tabs  Commonly known as: VITAMIN B6      Dose: 100 mg  Take 100 mg by mouth daily.  Refills: 0     rosuvastatin 20 MG tablet  Commonly known as: CRESTOR      TAKE 1 TABLET(20 MG) BY MOUTH DAILY AT BEDTIME  Refills: 0     Toujeo SoloStar 300 UNIT/ML (1 units dial) pen  Generic drug: insulin glargine U-300      Dose: 16 Units  Inject 16 Units subcutaneously every evening.  Refills: 0     triamcinolone 0.1 % external cream  Commonly known as: KENALOG      Apply topically to affected areas on body BID for 1-3 weeks. Do not use on face, body folds or genitals.  Refills: 0     Trulicity  3 MG/0.5ML Soaj  Generic drug: Dulaglutide      ADMINISTER 3 MG UNDER THE SKIN 1 TIME A WEEK  Refills: 0     vitamin D3 25 MCG (1000 UT) Caps      Take 1 Capsule by mouth every morning. Indications: Osteoporosis  Refills: 0            STOP taking      clomiPRAMINE 50 MG capsule  Commonly known as: ANAFRANIL        LORazepam 0.5 MG tablet  Commonly known as: ATIVAN        omeprazole 20 MG DR capsule  Commonly known as: PriLOSEC                  Where to get your medicines        These medications were sent to [place], [place] - [address]  [address] [address]      Phone: [phone]   hydrOXYzine HCl 25 MG tablet  ketoconazole 2 % external cream  Lidocaine 4 % Patch  methocarbamol 500 MG tablet  pantoprazole 40 MG EC tablet  QUEtiapine 100 MG tablet  QUEtiapine 300 MG tablet  risperiDONE 0.5 MG tablet       Some of these will need a paper prescription and others can be bought over the counter. Ask your nurse if you have questions.    Bring a paper prescription for each of these medications  clonazePAM 0.5 MG tablet  You don't need a prescription for these medications  acetaminophen 325 MG tablet       Allergies   Allergies   Allergen Reactions    Benadryl [Diphenhydramine] Other (See Comments)     Tachycardia     Nsaids Other (See Comments)     Generalized edema     Moxifloxacin Rash

## 2025-06-24 NOTE — TELEPHONE ENCOUNTER
720AM: Per Mayra, pt can be removed from the list as planning to discharge today.     Pt removed from the worklist, intake no longer following.

## 2025-06-24 NOTE — PLAN OF CARE
"Goal Outcome Evaluation:      Plan of Care Reviewed With: patient    Overall Patient Progress: improvingOverall Patient Progress: improving       VS: /50 (BP Location: Right arm)   Pulse 66   Temp 97.4  F (36.3  C) (Oral)   Resp 18   Ht 1.707 m (5' 7.2\")   Wt 92 kg (202 lb 14.4 oz)   SpO2 95%   BMI 31.59 kg/m       O2: SpO2 > 90% on RA. Denied SOB/chest pain.    Output: Voiding adequately and spontaneously into toilet    Last BM: 6/21    Activity: SBA with walker    Skin: Bruising and R and L forearms    Pain: Managed with scheduled medications    CMS: Intermittently confused, disoriented to time    Dressing: None    Diet: Regular   LDA: None    Equipment: Walker, personal belongings    Plan: Discharge today at 1000 with    Additional Info:      DISCHARGE SUMMARY    Pt discharging to: Home   Transportation:    AVS given and discussed: Pt was given AVS and pt states understanding of content. Pt has no further questions.   Stoplight Tool given and discussed: Yes, no further questions.  Medications given: Yes, discussed. No further questions.   Belongings returned: Yes, ensured all belongings packed and sent with pt. No items in security.   Comments: Escorted safely to [facility] by transport.               "

## 2025-06-24 NOTE — TELEPHONE ENCOUNTER
R:    12:04 AM D/t no reviews for medical beds after 5pm, pt remains on worklist.    12:05 AM: There are no beds available for placement or review.     Pt remains on the work list pending appropriate bed availability.

## 2025-06-24 NOTE — PLAN OF CARE
Goal Outcome Evaluation:    Plan of Care Reviewed With: patient  Overall Patient Progress: improving     Neuro: A/O x4, forgetful at times.   GI/GUT: Continent of bowel and bladder. Last BM on 6/21/25.  Pain: Intermittent pain on hand fingers and lower back and was given with PRN Tylenol.   Ambulation/Transfers: SBA walker. Denied SOB, dizziness, light-headedness  Diet/ Liquids: Regular with good appetite and adequate fluid intake.  Tubes/ Lines/ Drains: n/a  Tube Feeding: n/a  Oxygen: RA  Skin: Intact.   PRN Hydroxyzine given when she woke up at 0200 anxious and confused.   Bed alarm on.   BG at 0200 was 275.  Discharge today,  will pick her up at 1000

## 2025-06-25 ENCOUNTER — PATIENT OUTREACH (OUTPATIENT)
Dept: CARE COORDINATION | Facility: CLINIC | Age: 80
End: 2025-06-25
Payer: COMMERCIAL

## 2025-06-25 NOTE — PROGRESS NOTES
Clinic Care Coordination Contact  Transitions of Care Outreach  Chief Complaint   Patient presents with    Clinic Care Coordination - Post Hospital       Most Recent Admission Date: 6/11/2025   Most Recent Admission Diagnosis: Acute psychosis (H) - F23  Pneumonia due to COVID-19 virus - U07.1, J12.82     Most Recent Discharge Date: 6/24/2025   Most Recent Discharge Diagnosis: Acute psychosis (H) - F23  Pneumonia due to COVID-19 virus - U07.1, J12.82  Rash - R21  Anxiety - F41.9  Low back pain, unspecified back pain laterality, unspecified chronicity, unspecified whether sciatica present - M54.50  Gastroesophageal reflux disease, unspecified whether esophagitis present - K21.9     Transitions of Care Assessment    Discharge Assessment  How are you doing now that you are home?: Patient shares that she is doing well and stayed busy yesterday getting a haricut and going to a movie. She shares that a nurse will come in tomorrow to set up medications. Patient will see her therapist tomorrow. No other questions/concerns or needs at this time. Thanks writer for the call  How are your symptoms? (Red Flag symptoms escalate to triage hotline per guidelines): Improved  Do you know how to contact your clinic care team if you have future questions or changes to your health status? : Yes  Does the patient have their discharge instructions? : Yes  Does the patient have questions regarding their discharge instructions? : No  Were you started on any new medications or were there changes to any of your previous medications? : Yes  Does the patient have all of their medications?: Yes  Do you have questions regarding any of your medications? : No  Do you have all of your needed medical supplies or equipment (DME)?  (i.e. oxygen tank, CPAP, cane, etc.): Yes    Post-op ([facility]A Only)  If the patient had a surgery or procedure, do they have any questions for a nurse?: No    Post-op (Clinicians Only)  Did the patient have surgery or a  procedure: No        Follow up Plan     Discharge Follow-Up  Discharge follow up appointment scheduled in alignment with recommended follow up timeframe or Transitions of Risk Category? (Low = within 30 days; Moderate= within 14 days; High= within 7 days): Yes  Discharge Follow Up Appointment Date: 06/26/25  Discharge Follow Up Appointment Scheduled with?: Specialty Care Provider (Therapy)    No future appointments.    Outpatient Plan as outlined on AVS reviewed with patient.    For any urgent concerns, please contact our 24 hour nurse triage line: [phone] ([phone])       VIDHYA Sales